# Patient Record
Sex: FEMALE | Race: ASIAN | NOT HISPANIC OR LATINO | Employment: FULL TIME | URBAN - METROPOLITAN AREA
[De-identification: names, ages, dates, MRNs, and addresses within clinical notes are randomized per-mention and may not be internally consistent; named-entity substitution may affect disease eponyms.]

---

## 2019-04-04 ENCOUNTER — TELEPHONE (OUTPATIENT)
Dept: FAMILY MEDICINE | Facility: CLINIC | Age: 29
End: 2019-04-04

## 2019-04-04 ENCOUNTER — OFFICE VISIT (OUTPATIENT)
Dept: FAMILY MEDICINE | Facility: CLINIC | Age: 29
End: 2019-04-04
Payer: COMMERCIAL

## 2019-04-04 VITALS
BODY MASS INDEX: 22.82 KG/M2 | WEIGHT: 137 LBS | HEIGHT: 65 IN | HEART RATE: 68 BPM | SYSTOLIC BLOOD PRESSURE: 112 MMHG | TEMPERATURE: 98.8 F | DIASTOLIC BLOOD PRESSURE: 64 MMHG

## 2019-04-04 DIAGNOSIS — Z11.4 SCREENING FOR HIV (HUMAN IMMUNODEFICIENCY VIRUS): ICD-10-CM

## 2019-04-04 DIAGNOSIS — Z30.40 ENCOUNTER FOR SURVEILLANCE OF CONTRACEPTIVES, UNSPECIFIED CONTRACEPTIVE: ICD-10-CM

## 2019-04-04 DIAGNOSIS — Z12.4 SCREENING FOR MALIGNANT NEOPLASM OF CERVIX: ICD-10-CM

## 2019-04-04 DIAGNOSIS — Z00.00 ROUTINE HISTORY AND PHYSICAL EXAMINATION OF ADULT: Primary | ICD-10-CM

## 2019-04-04 LAB
CHOLEST SERPL-MCNC: 191 MG/DL
GLUCOSE SERPL-MCNC: 84 MG/DL (ref 70–99)
HDLC SERPL-MCNC: 67 MG/DL
HIV 1+2 AB+HIV1 P24 AG SERPL QL IA: NONREACTIVE
LDLC SERPL CALC-MCNC: 100 MG/DL
NONHDLC SERPL-MCNC: 124 MG/DL
TRIGL SERPL-MCNC: 121 MG/DL

## 2019-04-04 PROCEDURE — 80061 LIPID PANEL: CPT | Performed by: FAMILY MEDICINE

## 2019-04-04 PROCEDURE — 82947 ASSAY GLUCOSE BLOOD QUANT: CPT | Performed by: FAMILY MEDICINE

## 2019-04-04 PROCEDURE — 87389 HIV-1 AG W/HIV-1&-2 AB AG IA: CPT | Performed by: FAMILY MEDICINE

## 2019-04-04 PROCEDURE — 99385 PREV VISIT NEW AGE 18-39: CPT | Performed by: FAMILY MEDICINE

## 2019-04-04 PROCEDURE — 36415 COLL VENOUS BLD VENIPUNCTURE: CPT | Performed by: FAMILY MEDICINE

## 2019-04-04 PROCEDURE — G0145 SCR C/V CYTO,THINLAYER,RESCR: HCPCS | Performed by: FAMILY MEDICINE

## 2019-04-04 RX ORDER — NORETHINDRONE ACETATE AND ETHINYL ESTRADIOL 1MG-20(21)
1 KIT ORAL DAILY
Refills: 14 | COMMUNITY
Start: 2019-03-12 | End: 2019-04-04

## 2019-04-04 RX ORDER — NORETHINDRONE ACETATE AND ETHINYL ESTRADIOL 1MG-20(21)
1 KIT ORAL DAILY
Qty: 84 TABLET | Refills: 3 | Status: SHIPPED | OUTPATIENT
Start: 2019-04-04 | End: 2020-12-01

## 2019-04-04 SDOH — HEALTH STABILITY: MENTAL HEALTH: HOW MANY STANDARD DRINKS CONTAINING ALCOHOL DO YOU HAVE ON A TYPICAL DAY?: 1 OR 2

## 2019-04-04 SDOH — HEALTH STABILITY: MENTAL HEALTH: HOW OFTEN DO YOU HAVE A DRINK CONTAINING ALCOHOL?: 2-3 TIMES A WEEK

## 2019-04-04 ASSESSMENT — MIFFLIN-ST. JEOR: SCORE: 1358.56

## 2019-04-04 NOTE — Clinical Note
Pap smear done on this date: 2016 (approximately), by this group: [facility], results were Normal.

## 2019-04-04 NOTE — PROGRESS NOTES
SUBJECTIVE:   CC: Kimmy Behtea is an 28 year old woman who presents for preventive health visit.     Healthy Habits:    Do you get at least three servings of calcium containing foods daily (dairy, green leafy vegetables, etc.)? yes    Amount of exercise or daily activities, outside of work: 2 day(s) per week    Problems taking medications regularly No    Medication side effects: No    Have you had an eye exam in the past two years? yes    Do you see a dentist twice per year? yes    Do you have sleep apnea, excessive snoring or daytime drowsiness?no  Pap smear done on this date: 2016 (approximately), by this group: [facility], results were Normal.         PROBLEMS TO ADD ON...    Today's PHQ-2 Score:   PHQ-2 ( 1999 [employer]) 4/4/2019   Q1: Little interest or pleasure in doing things 0   Q2: Feeling down, depressed or hopeless 0   PHQ-2 Score 0       Abuse: Current or Past(Physical, Sexual or Emotional)- NO  Do you feel safe in your environment? Yes    Social History     Tobacco Use     Smoking status: Never Smoker     Smokeless tobacco: Never Used   Substance Use Topics     Alcohol use: Yes     Frequency: 2-3 times a week     Drinks per session: 1 or 2     If you drink alcohol do you typically have >3 drinks per day or >7 drinks per week? No                     Reviewed orders with patient.  Reviewed health maintenance and updated orders accordingly - Yes  Patient Active Problem List   Diagnosis     Dermatitis     No past surgical history on file.    Social History     Tobacco Use     Smoking status: Never Smoker     Smokeless tobacco: Never Used   Substance Use Topics     Alcohol use: Yes     Frequency: 2-3 times a week     Drinks per session: 1 or 2     Family History   Problem Relation Age of Onset     Hypertension Father      Melanoma No family hx of      Skin Cancer No family hx of            Mammogram not appropriate for this patient based on age.    Pertinent mammograms are reviewed under the imaging  "tab.  History of abnormal Pap smear: NO - age 21-29 PAP every 3 years recommended     Reviewed and updated as needed this visit by clinical staff  Tobacco  Allergies  Meds  Fam Hx  Soc Hx        Reviewed and updated as needed this visit by Provider        Past Medical History:   Diagnosis Date     Dermatitis 9/23/2016        ROS:  CONSTITUTIONAL: NEGATIVE for fever, chills, change in weight  INTEGUMENTARU/SKIN: NEGATIVE for worrisome rashes, moles or lesions  EYES: NEGATIVE for vision changes or irritation  ENT: NEGATIVE for ear, mouth and throat problems  RESP: NEGATIVE for significant cough or SOB  BREAST: NEGATIVE for masses, tenderness or discharge  CV: NEGATIVE for chest pain, palpitations or peripheral edema  GI: NEGATIVE for nausea, abdominal pain, heartburn, or change in bowel habits  : NEGATIVE for unusual urinary or vaginal symptoms. Periods are regular.  MUSCULOSKELETAL: NEGATIVE for significant arthralgias or myalgia  NEURO: NEGATIVE for weakness, dizziness or paresthesias  ENDOCRINE: NEGATIVE for temperature intolerance, skin/hair changes  HEME/ALLERGY/IMMUNE: NEGATIVE for bleeding problems  PSYCHIATRIC: NEGATIVE for changes in mood or affect    OBJECTIVE:   /64   Pulse 68   Temp 98.8  F (37.1  C) (Tympanic)   Ht 1.645 m (5' 4.76\")   Wt 62.1 kg (137 lb)   LMP 04/01/2019   BMI 22.96 kg/m    EXAM:  GENERAL: healthy, alert and no distress  EYES: Eyes grossly normal to inspection, PERRL and conjunctivae and sclerae normal  HENT: ear canals and TM's normal, nose and mouth without ulcers or lesions  NECK: no adenopathy, no asymmetry, masses, or scars and thyroid normal to palpation  RESP: lungs clear to auscultation - no rales, rhonchi or wheezes  BREAST: normal without masses, tenderness or nipple discharge and no palpable axillary masses or adenopathy  CV: regular rate and rhythm, normal S1 S2, no S3 or S4, no murmur, click or rub, no peripheral edema and peripheral pulses strong  ABDOMEN: " soft, nontender, no hepatosplenomegaly, no masses and bowel sounds normal   (female): normal female external genitalia, normal urethral meatus, vaginal mucosa pink, moist, well rugated, and normal cervix/adnexa/uterus without masses or discharge  RECTAL: deferred  MS: no gross musculoskeletal defects noted, no edema  SKIN: no suspicious lesions or rashes  NEURO: Normal strength and tone, mentation intact and speech normal  PSYCH: mentation appears normal, affect normal/bright        ASSESSMENT/PLAN:   (Z00.00) Routine history and physical examination of adult  (primary encounter diagnosis)  Comment:   Plan: HIV Screening, Pap imaged thin layer screen         reflex to HPV if ASCUS - recommend age 25 - 29,        Lipid panel reflex to direct LDL Fasting,         Glucose            (Z12.4) Screening for malignant neoplasm of cervix  Comment:   Plan: Pap imaged thin layer screen reflex to HPV if         ASCUS - recommend age 25 - 29            (Z11.4) Screening for HIV (human immunodeficiency virus)  Comment:   Plan: HIV Screening            (Z30.40) Encounter for surveillance of contraceptives, unspecified contraceptive  Comment:   Plan: norethindrone-ethinyl estradiol (JUNEL FE 1/20)        1-20 MG-MCG tablet            Check labs. refill sent.Cares and  treatment discussed follow. up if problem   Patient expressed understanding and agreement with treatment plan. All patient's questions were answered, will let me know if has more later.  Medications: Rx's: Reviewed the potential side effects/complications of medications prescribed.     COUNSELING:   Reviewed preventive health counseling, as reflected in patient instructions       Regular exercise       Healthy diet/nutrition       Vision screening       Hearing screening       Immunizations    Previously Vaccinated for: TDAP  , she will bring records            Contraception       Osteoporosis Prevention/Bone Health       HIV screeninx in teen years, 1x in  "adult years, and at intervals if high risk    BP Readings from Last 1 Encounters:   04/04/19 112/64     Estimated body mass index is 22.96 kg/m  as calculated from the following:    Height as of this encounter: 1.645 m (5' 4.76\").    Weight as of this encounter: 62.1 kg (137 lb).           reports that  has never smoked. she has never used smokeless tobacco.      Counseling Resources:  ATP IV Guidelines  Pooled Cohorts Equation Calculator  Breast Cancer Risk Calculator  FRAX Risk Assessment  ICSI Preventive Guidelines  Dietary Guidelines for Americans, 2010  USDA's MyPlate  ASA Prophylaxis  Lung CA Screening    Jeniffer Small MD  [facility] [place]  "

## 2019-04-04 NOTE — TELEPHONE ENCOUNTER
Pt in for physical today, awaiting lab results to complete biometric form which was given to Tasha GOODWIN CMA

## 2019-04-05 NOTE — TELEPHONE ENCOUNTER
Forms have been filled out and placed in Dr. Small's in basket for completion.      .Tasha MENDIOLA    [facility] [place]

## 2019-04-08 LAB
COPATH REPORT: NORMAL
PAP: NORMAL

## 2019-04-09 NOTE — TELEPHONE ENCOUNTER
Completed, copy faxed to [facility] customer service and copy faxed to medical records.      .Tasha MENDIOLA    [facility] [place]

## 2019-08-20 ENCOUNTER — OFFICE VISIT (OUTPATIENT)
Dept: FAMILY MEDICINE | Facility: CLINIC | Age: 29
End: 2019-08-20
Payer: COMMERCIAL

## 2019-08-20 VITALS
WEIGHT: 135 LBS | SYSTOLIC BLOOD PRESSURE: 110 MMHG | HEART RATE: 75 BPM | HEIGHT: 65 IN | OXYGEN SATURATION: 99 % | DIASTOLIC BLOOD PRESSURE: 78 MMHG | TEMPERATURE: 99.1 F | BODY MASS INDEX: 22.49 KG/M2

## 2019-08-20 DIAGNOSIS — M77.8 BILATERAL ELBOW TENDONITIS: ICD-10-CM

## 2019-08-20 DIAGNOSIS — M77.8 WRIST TENDONITIS: Primary | ICD-10-CM

## 2019-08-20 PROCEDURE — 99214 OFFICE O/P EST MOD 30 MIN: CPT | Performed by: FAMILY MEDICINE

## 2019-08-20 RX ORDER — NAPROXEN 500 MG/1
500 TABLET ORAL 2 TIMES DAILY WITH MEALS
Qty: 30 TABLET | Refills: 0 | Status: SHIPPED | OUTPATIENT
Start: 2019-08-20 | End: 2019-10-17

## 2019-08-20 ASSESSMENT — MIFFLIN-ST. JEOR: SCORE: 1349.43

## 2019-08-20 NOTE — PATIENT INSTRUCTIONS
Patient Education     Tendonitis  A tendon is the thick fibrous cord that joins muscle to bone and allows joints to move. When a tendon becomes inflamed, it is called tendonitis. This can occur from overuse, injury, or infection. This usually involves the shoulders, forearm, wrist, hands and feet. Symptoms include pain, swelling and tenderness to the touch. Moving the joint increases the pain.  It takes 4 to 6 weeks or more for tendonitis to heal. It is treated by preventing motion of the tendon, occasionally with a splint or brace, and the use of anti-inflammatory medicine.  Home care    Some people find relief with ice packs. These can be crushed or cubed ice in a plastic bag or a bag of frozen vegetables wrapped in a thin towel. Other people get better relief with heat. This can include a hot shower, hot bath, or a moist towel warmed in a microwave. Try each and use the method that feels best, for 15 to 20 minutes several times a day.    Rest the inflamed joint and protect it from movement.    You may use over-the-counter ibuprofen or naproxen to treat pain and inflammation, unless another medicine was prescribed. If you can't take these medicines, acetaminophen may help with the pain, but does not treat inflammation. If you have chronic liver or kidney disease or ever had a stomach ulcer or gastrointestinal bleeding, talk with your doctor before using these medicines.    As your symptoms improve, begin gradual motion at the involved joint.  Follow-up care  Follow up with your healthcare provider if you are not improving after 5 to 7 days of treatment.  When to seek medical advice  Call your healthcare provider right away if any of these occur:    Redness over the painful area    Increasing pain or swelling at the joint    Fever lasting 24 to 48 hours or chills, or as advised by your healthcare provider  Date Last Reviewed: 5/1/2018 2000-2018 [employer]. [address], [place]  [address]. All rights reserved. This information is not intended as a substitute for professional medical care. Always follow your healthcare professional's instructions.

## 2019-08-20 NOTE — PROGRESS NOTES
Subjective     Kimmy Bethea is a 28 year old female who presents to clinic today for the following health issues:    HPI   Musculoskeletal problem/pain      Duration: ongoing since July     Description  Location: both arms -  starts at wrist and goes up the arm wrist / elbow area , on and off and becoming more frequent and constant now .     Intensity:  Severe, sharp pain     Accompanying signs and symptoms: radiation of pain to arm, no numbness, no swelling .      History  Previous similar problem: no   Previous evaluation:  none    Precipitating or alleviating factors:  Trauma or overuse: no recall of any injury but  she was canoeing back in July and she thinks it may have started few days after    also she does work on computer / [employer] etc. .   Aggravating factors include: feels worse when she is doing stuff in kitchen , cutting etc , doing computer is not too bad     Therapies tried and outcome: nothing      Patient Active Problem List   Diagnosis     Dermatitis     Past Surgical History:   Procedure Laterality Date     NO HISTORY OF SURGERY         Social History     Tobacco Use     Smoking status: Never Smoker     Smokeless tobacco: Never Used   Substance Use Topics     Alcohol use: Yes     Frequency: 2-3 times a week     Drinks per session: 1 or 2     Family History   Problem Relation Age of Onset     Hypertension Father      Coronary Artery Disease Other         maternal uncle at age 50+     Hyperlipidemia Mother      Melanoma No family hx of      Skin Cancer No family hx of      Diabetes No family hx of      Cerebrovascular Disease No family hx of      Breast Cancer No family hx of      Colon Cancer No family hx of            Reviewed and updated as needed this visit by Provider         Review of Systems   ROS COMP: Constitutional, HEENT, cardiovascular, pulmonary, GI, , musculoskeletal, neuro, skin, endocrine and psych systems are negative, except as otherwise noted.      Objective    There were no vitals  taken for this visit.  There is no height or weight on file to calculate BMI.  Physical Exam   GENERAL: healthy, alert and no distress  RESP: lungs clear to auscultation - no rales, rhonchi or wheezes  CV: regular rate and rhythm, normal S1 S2,   MS: bth wrist/ arms/ elbows with  no obvious swelling or erythema , has good  range of motion, of wrist, elbow and fingers,   and tenderness to palpation mostly on ventral aspect of both elbow/ forearm muscles and medial epicondyle of elbows. Elbow and wrist motion against resistance aggravates discomfort   SKIN: no suspicious lesions or rashes  NEURO: Normal strength and tone            Assessment & Plan     (M77.8) Wrist tendonitis  (primary encounter diagnosis)  Comment: bilateral, forearm/ wrist pain   Plan: naproxen (NAPROSYN) 500 MG tablet            (M77.8) Bilateral elbow tendonitis  Comment: bilateral,medial epicondylitis   Plan: naproxen (NAPROSYN) 500 MG tablet          Strain/ tendonitis likely related to canoeing      discussed  cares and symptomatic treatment including  adequate pain control, ice ,  stretches etc. Script faxed    she will do follow up if no improvement or problem. Consider further evaluation and  physical therapy if needed.     Patient expressed understanding and agreement with treatment plan. All patient's questions were answered, will let me know if has more later.  Medications: Rx's: Reviewed the potential side effects/complications of medications prescribed.       Return in about 2 weeks (around 9/3/2019), or sooner if problem .    Jeniffer Small MD  [facility] [place]

## 2019-10-17 ENCOUNTER — OFFICE VISIT (OUTPATIENT)
Dept: FAMILY MEDICINE | Facility: CLINIC | Age: 29
End: 2019-10-17
Payer: COMMERCIAL

## 2019-10-17 VITALS
TEMPERATURE: 98.6 F | SYSTOLIC BLOOD PRESSURE: 108 MMHG | HEART RATE: 74 BPM | OXYGEN SATURATION: 98 % | HEIGHT: 65 IN | BODY MASS INDEX: 21.99 KG/M2 | DIASTOLIC BLOOD PRESSURE: 62 MMHG | WEIGHT: 132 LBS

## 2019-10-17 DIAGNOSIS — H60.392 INFECTIVE OTITIS EXTERNA, LEFT: ICD-10-CM

## 2019-10-17 DIAGNOSIS — Z30.40 ENCOUNTER FOR SURVEILLANCE OF CONTRACEPTIVES, UNSPECIFIED CONTRACEPTIVE: ICD-10-CM

## 2019-10-17 DIAGNOSIS — H93.12 RINGING IN EAR, LEFT: ICD-10-CM

## 2019-10-17 DIAGNOSIS — H66.90 EAR INFECTION: Primary | ICD-10-CM

## 2019-10-17 PROCEDURE — 99214 OFFICE O/P EST MOD 30 MIN: CPT | Performed by: FAMILY MEDICINE

## 2019-10-17 RX ORDER — AZITHROMYCIN 250 MG/1
TABLET, FILM COATED ORAL
Qty: 6 TABLET | Refills: 0 | Status: SHIPPED | OUTPATIENT
Start: 2019-10-17 | End: 2019-10-22

## 2019-10-17 RX ORDER — OFLOXACIN 3 MG/ML
10 SOLUTION AURICULAR (OTIC) DAILY
Qty: 5 ML | Refills: 0 | Status: SHIPPED | OUTPATIENT
Start: 2019-10-17 | End: 2019-10-27

## 2019-10-17 ASSESSMENT — MIFFLIN-ST. JEOR: SCORE: 1330.66

## 2019-10-17 NOTE — PROGRESS NOTES
Subjective     Kimmy Bethea is a 29 year old female who presents to clinic today for the following health issues:    HPI     Ear problem x 1 week - ringing in left ear since going to a concert last week,   although she can still hear ok- some discomfort and ache in both ears but left is more. Trying to use Q tip to help but not helping.   No nasal congestion - back of throat is also irritated. No fever or chills. No allergies . no swimming  lately . Sx come  and goes but concerned with ongoing sx . No dizziness etc      PROBLEMS TO ADD ON...  Medication Follow up of Birth control pill    Taking Medication as prescribed: yes    Side Effects:  Periods are irregular , since she has changed new pills , but mostly she has very light period or no period  some months. But this last time  she got period although her bleeding  is not more then her usual  but lasted for 5-6 days, so was concerned No bleeding in between cycle. Taking BCP regularly     Medication Helping Symptoms:  Yes - but wondering if she needs  to change dose due to possible side effects           Patient Active Problem List   Diagnosis     Dermatitis     Wrist tendonitis     Bilateral elbow tendonitis     Past Surgical History:   Procedure Laterality Date     NO HISTORY OF SURGERY         Social History     Tobacco Use     Smoking status: Never Smoker     Smokeless tobacco: Never Used   Substance Use Topics     Alcohol use: Yes     Frequency: 2-3 times a week     Drinks per session: 1 or 2     Family History   Problem Relation Age of Onset     Hypertension Father      Coronary Artery Disease Other         maternal uncle at age 50+     Hyperlipidemia Mother      Melanoma No family hx of      Skin Cancer No family hx of      Diabetes No family hx of      Cerebrovascular Disease No family hx of      Breast Cancer No family hx of      Colon Cancer No family hx of            Reviewed and updated as needed this visit by Provider         Review of Systems   ROS COMP:  Constitutional, HEENT, cardiovascular, pulmonary, GI, , musculoskeletal, neuro, skin, endocrine and psych systems are negative, except as otherwise noted.      Objective    There were no vitals taken for this visit.  There is no height or weight on file to calculate BMI.  Physical Exam   GENERAL: healthy, alert and no distress  EYES: Eyes grossly normal to inspection,  conjunctivae and sclerae normal  HENT:  right ear canal and TM : normal  LEFT ear , has wax, so as I tried to clean with curet I was able to visualize part of ear canal it looked inflamed and she was quiet sensitive, and TM  Also only partially visualized and slightly erythematous.  nasal mucosa slightly edematous , oropharynx clear and oral mucous membranes moist, no sinus tenderness  NECK: no adenopathy  RESP: lungs clear to auscultation - no rales, rhonchi or wheezes  CV: regular rate and rhythm, normal S1 S2, no S3 or S4,   NEURO: Normal strength and tone,             Assessment & Plan     (H66.90) Ear infection  (primary encounter diagnosis)  Comment: left OM   Plan: azithromycin (ZITHROMAX) 250 MG tablet            (H60.392) Infective otitis externa, left  Comment: also still has some wax, but ear canal was very sore so did not do ear lavage   Plan: ofloxacin (FLOXIN) 0.3 % otic solution            (H93.12) Ringing in ear, left  Comment:   Plan: discussed acres and concerns and treatment. She will do follow up if no improvement or ongoing problem     (Z30.40) Encounter for surveillance of contraceptives, unspecified contraceptive  Comment: no breakthrough bleeding   Plan: reassurance and observation. Her cycle is still regular so she is comfortable continuing and f/u if any problem        Return in about 2 weeks (around 10/31/2019). if no improvement or problem     Jeniffer Small MD  [facility] [place]

## 2019-10-18 ENCOUNTER — TELEPHONE (OUTPATIENT)
Dept: FAMILY MEDICINE | Facility: CLINIC | Age: 29
End: 2019-10-18

## 2019-10-18 NOTE — TELEPHONE ENCOUNTER
I agree with this. I would add a decongestant (Sudafed) and an antihistamine (Zyrtec or allegra) to help facilitate resolution of any effusion behind the ear drum if that is a contributor.    Without looking in her ear, it is impossible to tell if wax is causing muffled hearing or if it is something else. Go to  if this worsens over the weekend. Otherwise, see Dr. Small Monday.     Thanks.

## 2019-10-18 NOTE — TELEPHONE ENCOUNTER
LOV yesterday with Dr. Small. New ear pain in the left ear since starting ear drops yesterday (olofloxacin) for infective otitis externa. Patient was also started on a Z annie.    Patient calling today not due to the new pain, but due to clogged feeling and decreased hearing in that ear. Patient is concerned that it may not be a temporary loss of hearing.     States that Dr. Small did say that there was a lot of wax in her left ear. Did not want to remove it due to infection.    Advised the patient to take warm shower, apply warm compresses to her left ear 15-20 minutes 4 times a day and to take tylenol for pain. Advised that the clogged feeling is probably due to ear wax.    Advised UC for new or worsening symptoms. Schedule with Dr. Small on Monday if symptoms don't improve.    Anything else please advise.   Patient requests call

## 2019-10-18 NOTE — TELEPHONE ENCOUNTER
Reason for call:  Patient reporting a symptom    Symptom or request: Ears clogged      Duration (how long have symptoms been present): 1 day     Have you been treated for this before? Yes    Additional comments: Patient came in for ringing in ears and was prescribed ear drops. She now says that she feels like her ear are clogged and its getting worse. Would like to speak to someone about what do.     Phone Number patient can be reached at:  Home number on file [phone] (home)    Best Time:  Anytime     Can we leave a detailed message on this number:  YES    Call taken on 10/18/2019 at 2:01 PM by Radha Melendez

## 2019-10-21 ENCOUNTER — OFFICE VISIT (OUTPATIENT)
Dept: FAMILY MEDICINE | Facility: CLINIC | Age: 29
End: 2019-10-21
Payer: COMMERCIAL

## 2019-10-21 VITALS
HEART RATE: 71 BPM | TEMPERATURE: 98.8 F | WEIGHT: 132 LBS | BODY MASS INDEX: 21.99 KG/M2 | DIASTOLIC BLOOD PRESSURE: 70 MMHG | HEIGHT: 65 IN | SYSTOLIC BLOOD PRESSURE: 112 MMHG | OXYGEN SATURATION: 99 %

## 2019-10-21 DIAGNOSIS — R09.81 NASAL CONGESTION: ICD-10-CM

## 2019-10-21 DIAGNOSIS — H60.392 INFECTIVE OTITIS EXTERNA, LEFT: ICD-10-CM

## 2019-10-21 DIAGNOSIS — H66.90 EAR INFECTION: ICD-10-CM

## 2019-10-21 DIAGNOSIS — H61.20 WAX IN EAR: Primary | ICD-10-CM

## 2019-10-21 PROCEDURE — 99213 OFFICE O/P EST LOW 20 MIN: CPT | Performed by: FAMILY MEDICINE

## 2019-10-21 RX ORDER — FLUTICASONE PROPIONATE 50 MCG
1-2 SPRAY, SUSPENSION (ML) NASAL DAILY
Qty: 16 G | Status: SHIPPED | OUTPATIENT
Start: 2019-10-21 | End: 2019-11-05

## 2019-10-21 ASSESSMENT — MIFFLIN-ST. JEOR: SCORE: 1330.66

## 2019-10-21 NOTE — PROGRESS NOTES
Subjective     Kimmy Bethea is a 29 year old female who presents to clinic today for the following health issues:    HPI   Acute Illness   Acute illness concerns: EAR, was treated for ear infection last week and she is better pain wise but ear is   still plugged so affecting hearing an has ringing   No previous hearing problem or ringing etc   Onset: x one week    Fever: no    Chills/Sweats: no    Headache (location?): no    Sinus Pressure:no    Conjunctivitis:  no    Ear Pain: YES: left, also had wax but not able to get rid off    Rhinorrhea: Has some sniffles and congestion typically can fall, so she thinks it could be allergies.  Has been on the Flonase before using it currently willing to try that again.     Congestion: no    Sore Throat: no     Cough: no    Wheeze: no    Decreased Appetite: no    Nausea: no    Vomiting: no    Diarrhea:  no    Dysuria/Freq.: no    Fatigue/Achiness: no    Sick/Strep Exposure: no     Therapies Tried and outcome:         Patient Active Problem List   Diagnosis     Dermatitis     Wrist tendonitis     Bilateral elbow tendonitis     Past Surgical History:   Procedure Laterality Date     NO HISTORY OF SURGERY         Social History     Tobacco Use     Smoking status: Never Smoker     Smokeless tobacco: Never Used   Substance Use Topics     Alcohol use: Yes     Frequency: 2-3 times a week     Drinks per session: 1 or 2     Family History   Problem Relation Age of Onset     Hypertension Father      Coronary Artery Disease Other         maternal uncle at age 50+     Hyperlipidemia Mother      Melanoma No family hx of      Skin Cancer No family hx of      Diabetes No family hx of      Cerebrovascular Disease No family hx of      Breast Cancer No family hx of      Colon Cancer No family hx of              Reviewed and updated as needed this visit by Provider         Review of Systems   ROS COMP: Constitutional, HEENT, cardiovascular, pulmonary, GI, , musculoskeletal, neuro, skin, endocrine  and psych systems are negative, except as otherwise noted.      Objective    There were no vitals taken for this visit.  There is no height or weight on file to calculate BMI.  Physical Exam   GENERAL: healthy, alert and no distress  EYES: Eyes grossly normal to inspection, PERRL and conjunctivae and sclerae normal  NECK: no adenopathy  .HEETN-throat without any pharyngeal erythema or any lesions , nasal mucosa slightly edematous with clear drainage, right TM and ear canal is normal.  Left and ear canal-still has some wax , looks soft so I attempted to clean with  lighted ear curette, and had good results.  Quite a bit of wax was removed, she still had small deep wax but eardrum was also partially visualized and looks normal.  At that point patient also reported improvement and she could hear the ringing was gone so we decided to quit not DIG  it further to remove the rest of the wax.   RESP: lungs clear to auscultation - no rales, rhonchi or wheezes  CV: regular rate and rhythm, normal S1 S2, no S3 or S4, no murmur, click or rub, no peripheral edema and peripheral pulses strong            Assessment & Plan     (H61.20) Wax in ear  (primary encounter diagnosis)  Comment:   Plan: Cerumenosis is noted.  Wax is removed by  manual debridement, with good results.  Patient reported improvement and she could hear well and told me that the ringing has resolved. home care to prevent wax buildup       (H66.90) Ear infection  Comment: left , improved  Plan: Improved    (H60.392) Infective otitis externa, left  Comment: She will finish her eardrops.   Plan: Improved    (R09.81) Nasal congestion  Comment: seasonal , fall mostly  Plan: fluticasone (FLONASE) 50 MCG/ACT nasal spray        She has history of some nasal congestion during for.  Has been on Flonase previously.  Willing to use that season as needed . refill was given        Patient expressed understanding and agreement with treatment plan. All patient's questions were  answered, will let me know if has more later.  Medications: Rx's: Reviewed the potential side effects/complications of medications prescribed.       Return in about 4 weeks (around 11/18/2019). id sx persist or ongoing problem     Jeniffer Small MD  [facility] [place]

## 2019-11-04 DIAGNOSIS — R09.81 NASAL CONGESTION: ICD-10-CM

## 2019-11-05 RX ORDER — FLUTICASONE PROPIONATE 50 MCG
1-2 SPRAY, SUSPENSION (ML) NASAL DAILY
Qty: 16 G | Refills: 11 | Status: SHIPPED | OUTPATIENT
Start: 2019-11-05 | End: 2021-09-16

## 2019-11-05 NOTE — TELEPHONE ENCOUNTER
"Requested Prescriptions   Pending Prescriptions Disp Refills     fluticasone (FLONASE) 50 MCG/ACT nasal spray 16 g prn     Sig: Spray 1-2 sprays into both nostrils daily  Last Written Prescription Date:  10/21/19  Last Fill Quantity: 16g,  # refills: prn   Last office visit: 10/21/2019 with prescribing provider:  Geovanny   Future Office Visit:           Inhaled Steroids Protocol Passed - 11/4/2019  4:59 PM        Passed - Patient is age 12 or older        Passed - Recent (12 mo) or future (30 days) visit within the authorizing provider's specialty     Patient has had an office visit with the authorizing provider or a provider within the authorizing providers department within the previous 12 mos or has a future within next 30 days. See \"Patient Info\" tab in inbasket, or \"Choose Columns\" in Meds & Orders section of the refill encounter.              Passed - Medication is active on med list          "

## 2020-03-07 ENCOUNTER — OFFICE VISIT (OUTPATIENT)
Dept: URGENT CARE | Facility: URGENT CARE | Age: 30
End: 2020-03-07
Payer: COMMERCIAL

## 2020-03-07 VITALS
WEIGHT: 133 LBS | SYSTOLIC BLOOD PRESSURE: 108 MMHG | TEMPERATURE: 99.8 F | OXYGEN SATURATION: 97 % | HEART RATE: 68 BPM | DIASTOLIC BLOOD PRESSURE: 74 MMHG | BODY MASS INDEX: 22.3 KG/M2

## 2020-03-07 DIAGNOSIS — H92.02 OTALGIA, LEFT: Primary | ICD-10-CM

## 2020-03-07 PROCEDURE — 99213 OFFICE O/P EST LOW 20 MIN: CPT | Performed by: FAMILY MEDICINE

## 2020-03-07 RX ORDER — CIPROFLOXACIN AND DEXAMETHASONE 3; 1 MG/ML; MG/ML
4 SUSPENSION/ DROPS AURICULAR (OTIC) 2 TIMES DAILY
Qty: 7.5 ML | Refills: 0 | Status: SHIPPED | OUTPATIENT
Start: 2020-03-07 | End: 2021-09-16

## 2020-03-07 NOTE — PROGRESS NOTES
Subjective     Kimmy Bethea is a 29 year old female who presents to clinic today for the following health issues:    HPI   Chief Complaint   Patient presents with     Urgent Care     Ear Problem     left side of face,ear hurts.       Duration: 1 week     Description (location/character/radiation): left ear pain, headache and facial pain    Intensity:  moderate    Accompanying signs and symptoms: no fever, chills, sore throat    History (similar episodes/previous evaluation): None    Precipitating or alleviating factors: None    Therapies tried and outcome: debrox         Patient Active Problem List   Diagnosis     Dermatitis     Wrist tendonitis     Bilateral elbow tendonitis     Past Surgical History:   Procedure Laterality Date     NO HISTORY OF SURGERY         Social History     Tobacco Use     Smoking status: Never Smoker     Smokeless tobacco: Never Used   Substance Use Topics     Alcohol use: Yes     Frequency: 2-3 times a week     Drinks per session: 1 or 2     Family History   Problem Relation Age of Onset     Hypertension Father      Coronary Artery Disease Other         maternal uncle at age 50+     Hyperlipidemia Mother      Melanoma No family hx of      Skin Cancer No family hx of      Diabetes No family hx of      Cerebrovascular Disease No family hx of      Breast Cancer No family hx of      Colon Cancer No family hx of          Current Outpatient Medications   Medication Sig Dispense Refill     norethindrone-ethinyl estradiol (JUNEL FE 1/20) 1-20 MG-MCG tablet Take 1 tablet by mouth daily 84 tablet 3     fluticasone (FLONASE) 50 MCG/ACT nasal spray Spray 1-2 sprays into both nostrils daily (Patient not taking: Reported on 3/7/2020) 16 g 11     hydrocortisone 2.5 % ointment To lips twice daily. (Patient not taking: Reported on 4/4/2019) 30 g 2     No Known Allergies  Recent Labs   Lab Test 04/04/19  0841   *   HDL 67   TRIG 121      BP Readings from Last 3 Encounters:   03/07/20 108/74   10/21/19  112/70   10/17/19 108/62    Wt Readings from Last 3 Encounters:   03/07/20 60.3 kg (133 lb)   10/21/19 59.9 kg (132 lb)   10/17/19 59.9 kg (132 lb)                    Reviewed and updated as needed this visit by Provider         Review of Systems   ROS COMP: Constitutional, HEENT, cardiovascular, pulmonary, gi and gu systems are negative, except as otherwise noted.      Objective    /74   Pulse 68   Temp 99.8  F (37.7  C) (Oral)   Wt 60.3 kg (133 lb)   SpO2 97%   BMI 22.30 kg/m    Body mass index is 22.3 kg/m .  Physical Exam   GENERAL: alert and no distress  EYES: Eyes grossly normal to inspection, PERRL and conjunctivae and sclerae normal  HENT: normal cephalic/atraumatic, right ear: normal: no effusions, no erythema, normal landmarks, left ears: slightly erythematous external canal with some tenderness, nose and mouth without ulcers or lesions, oropharynx clear and oral mucous membranes moist, no abnormal rashes noted  NECK: no adenopathy, no asymmetry, masses, or scars and thyroid normal to palpation  RESP: lungs clear to auscultation - no rales, rhonchi or wheezes  CV: regular rates and rhythm, normal S1 S2, no S3 or S4 and no murmur, click or rub  ABDOMEN: soft, nontender  MS: no gross musculoskeletal defects noted, no edema    Assessment & Plan     (H92.02) Otalgia, left  (primary encounter diagnosis)  Comment: Differentials discussed in detail including otitis externa.  Ciprodex prescribed, common side effects discussed.  Suggested avoid using Q-tips.  Follow-up with ENT if symptoms persist or worsen.  Patient understood and in agreement with above plan.  All question answered.  Plan: ciprofloxacin-dexamethasone (CIPRODEX) 0.3-0.1         % otic suspension, OTOLARYNGOLOGY REFERRAL       Urgent Care Provider  [facility]

## 2020-06-24 ENCOUNTER — OFFICE VISIT (OUTPATIENT)
Dept: FAMILY MEDICINE | Facility: CLINIC | Age: 30
End: 2020-06-24
Payer: COMMERCIAL

## 2020-06-24 VITALS
BODY MASS INDEX: 23.14 KG/M2 | TEMPERATURE: 99.3 F | HEART RATE: 88 BPM | OXYGEN SATURATION: 100 % | DIASTOLIC BLOOD PRESSURE: 70 MMHG | SYSTOLIC BLOOD PRESSURE: 96 MMHG | WEIGHT: 138 LBS

## 2020-06-24 DIAGNOSIS — R59.9 ENLARGED LYMPH NODE: ICD-10-CM
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
## 2020-06-24 PROCEDURE — 99213 OFFICE O/P EST LOW 20 MIN: CPT | Performed by: FAMILY MEDICINE

## 2020-06-24 NOTE — PROGRESS NOTES
Subjective     Kimmy Bethea is a 29 year old female who presents to clinic today for the following health issues:    HPI   Concern - Pt states she noticed a lump on the left side of her neck two days ago.   Denies pain or warmth or tenderness on the lump       She denies and  sore throat, cough, cold,  sax no fever or chills, night sweat and she is feeling well otherwise    lump is not painful , some fatigue but it could be different work routine .   On further questioning  during exam,  she admits to bug bite on back of  neck left side. She thinsk it was quiet swollen red and painful although swelling has gone down but still feels irritated . No fever or chills        Patient Active Problem List   Diagnosis     Dermatitis     Wrist tendonitis     Bilateral elbow tendonitis     Past Surgical History:   Procedure Laterality Date     NO HISTORY OF SURGERY         Social History     Tobacco Use     Smoking status: Never Smoker     Smokeless tobacco: Never Used   Substance Use Topics     Alcohol use: Yes     Frequency: 2-3 times a week     Drinks per session: 1 or 2     Family History   Problem Relation Age of Onset     Hypertension Father      Coronary Artery Disease Other         maternal uncle at age 50+     Hyperlipidemia Mother      Melanoma No family hx of      Skin Cancer No family hx of      Diabetes No family hx of      Cerebrovascular Disease No family hx of      Breast Cancer No family hx of      Colon Cancer No family hx of            PROBLEMS TO ADD ON...  Reviewed and updated as needed this visit by Provider         Review of Systems   Constitutional, HEENT, cardiovascular, pulmonary, GI, , musculoskeletal, neuro, skin, endocrine and psych systems are negative, except as otherwise noted.      Objective    There were no vitals taken for this visit.  There is no height or weight on file to calculate BMI.  Physical Exam   GENERAL: healthy, alert and no distress  EYES: Eyes grossly normal to inspection  and  conjunctivae and sclerae normal  HENT: ear canals and TM's normal,  and mouth without ulcers or lesions  NECK: no anterior neck adenopathy although she has one small pea size LN along  left posterior cervical chain , mobile , non tender, no other  masses,  and thyroid normal to palpation  SKIN: on the back of left side of neck,  has an inch size area of erythema with mild central induration , no drainage but it looks  blood tinged with mild tenderness slight warmth  RESP: lungs clear to auscultation - no rales, rhonchi or wheezes  CV: regular rate and rhythm, normal S1 S2, no S3 or S4, no murmur,                 Assessment & Plan       (R59.9) Enlarged lymph node  Comment: left posterior cervical , likely reactive adenitis   Plan: amoxicillin-clavulanate (AUGMENTIN) 875-125 MG         tablet          (W57.XXXA) Bug bite, initial encounter  (primary encounter diagnosis)  Comment: left side of back of neck -   Plan: amoxicillin-clavulanate (AUGMENTIN) 875-125 MG         tablet          Start treatment with Augmentin. Skin cares and  treatment discussed. follow up if problem   Patient expressed understanding and agreement with treatment plan. All patient's questions were answered, will let me know if has more later.  Medications: Rx's: Reviewed the potential side effects/complications of medications prescribed.       Jeniffer Small MD  [facility] [place]

## 2020-10-13 ENCOUNTER — OFFICE VISIT (OUTPATIENT)
Dept: FAMILY MEDICINE | Facility: CLINIC | Age: 30
End: 2020-10-13
Payer: COMMERCIAL

## 2020-10-13 VITALS
HEART RATE: 71 BPM | SYSTOLIC BLOOD PRESSURE: 102 MMHG | WEIGHT: 136 LBS | OXYGEN SATURATION: 98 % | BODY MASS INDEX: 22.81 KG/M2 | DIASTOLIC BLOOD PRESSURE: 60 MMHG | TEMPERATURE: 99.2 F | RESPIRATION RATE: 14 BRPM

## 2020-10-13 DIAGNOSIS — M77.8 BILATERAL ELBOW TENDONITIS: Primary | ICD-10-CM

## 2020-10-13 DIAGNOSIS — M77.8 WRIST TENDONITIS: ICD-10-CM

## 2020-10-13 DIAGNOSIS — Z23 NEED FOR PROPHYLACTIC VACCINATION AND INOCULATION AGAINST INFLUENZA: ICD-10-CM

## 2020-10-13 DIAGNOSIS — M77.8 BILATERAL ELBOW TENDONITIS: ICD-10-CM

## 2020-10-13 PROCEDURE — 90471 IMMUNIZATION ADMIN: CPT | Performed by: FAMILY MEDICINE

## 2020-10-13 PROCEDURE — 99213 OFFICE O/P EST LOW 20 MIN: CPT | Mod: 25 | Performed by: FAMILY MEDICINE

## 2020-10-13 PROCEDURE — 90686 IIV4 VACC NO PRSV 0.5 ML IM: CPT | Performed by: FAMILY MEDICINE

## 2020-10-13 RX ORDER — NAPROXEN 500 MG/1
500 TABLET ORAL 2 TIMES DAILY WITH MEALS
Qty: 30 TABLET | Refills: 0 | Status: SHIPPED | OUTPATIENT
Start: 2020-10-13 | End: 2021-09-16

## 2020-10-13 NOTE — PATIENT INSTRUCTIONS
Patient Education     Tendonitis  A tendon is the thick fibrous cord that joins muscle to bone and allows joints to move. When a tendon becomes inflamed, it is called tendonitis. This can occur from overuse, injury, or infection. This usually involves the shoulders, forearm, wrist, hands and feet. Symptoms include pain, swelling and tenderness to the touch. Moving the joint increases the pain.  It takes 4 to 6 weeks or more for tendonitis to heal. It is treated by preventing motion of the tendon, occasionally with a splint or brace, and the use of anti-inflammatory medicine.  Home care    Some people find relief with ice packs. These can be crushed or cubed ice in a plastic bag or a bag of frozen vegetables wrapped in a thin towel. Other people get better relief with heat. This can include a hot shower, hot bath, or a moist towel warmed in a microwave. Try each and use the method that feels best, for 15 to 20 minutes several times a day.    Rest the inflamed joint and protect it from movement.    You may use over-the-counter ibuprofen or naproxen to treat pain and inflammation, unless another medicine was prescribed. If you can't take these medicines, acetaminophen may help with the pain, but does not treat inflammation. If you have chronic liver or kidney disease or ever had a stomach ulcer or gastrointestinal bleeding, talk with your doctor before using these medicines.    As your symptoms improve, begin gradual motion at the involved joint.  Follow-up care  Follow up with your healthcare provider if you are not improving after 5 to 7 days of treatment.  When to seek medical advice  Call your healthcare provider right away if any of these occur:    Redness over the painful area    Increasing pain or swelling at the joint    Fever lasting 24 to 48 hours or chills, or as advised by your healthcare provider  Date Last Reviewed: 5/1/2018 2000-2019 [employer]. [address] [facility], [place]  [address]. All rights reserved. This information is not intended as a substitute for professional medical care. Always follow your healthcare professional's instructions.

## 2020-10-13 NOTE — PROGRESS NOTES
Subjective     Kimmy Bethea is a 30 year old female who presents to clinic today for the following health issues:    HPI         Musculoskeletal problem/pain  Onset/Duration: 2-3  weeks   Description  Location: elbow - bilateral  Joint Swelling: no  Redness: no  Pain: YES  Warmth: no  Intensity:  moderate  Progression of Symptoms:   improving , but still has ongoing issue   Accompanying signs and symptoms:   Fevers: no  Numbness/tingling/weakness: no  History  Trauma to the area: no recall of injury , but possible over use as she has been  playing video game more often since covid, cooking and lot of keyboarding etc.  she has backed off in playing video game so it has helped,  but just concerned bc of ongoing sx so wanted to get checked   Recent illness:  no  Previous similar problem: YES. She has mostly had issue with wrist previously, although she thinks elbow were also bothering a bit at that time. Wrist have improved although still may be bit sensitive but elbows are bothering more   Previous evaluation:  YES  Precipitating or alleviating factors:  Aggravating factors include: lifting and overuse  Therapies tried and outcome: rest/inactivity and support wrap, elbow and wrist brace she is using       Review of Systems   Constitutional, HEENT, cardiovascular, pulmonary, GI, , musculoskeletal, neuro, skin, endocrine and psych systems are negative, except as otherwise noted.      Objective    There were no vitals taken for this visit.  There is no height or weight on file to calculate BMI.  Physical Exam   GENERAL: healthy, alert and no distress  RESP: lungs clear to auscultation - no rales, rhonchi or wheezes  CV: regular rate and rhythm, normal S1 S2,   ABDOMEN: soft, nontender, no hepatosplenomegaly, no masses and bowel sounds normal  MS: both both  elbows with good ROM  but has  and tenderness to palpation along forearm muscles but  not as much tenderness along elbow joints at this time, wrist also with no acute  tenderness but wrist ROM aggravates some discomfort   NEURO: Normal strength and tone        Assessment & Plan        (M77.8) Bilateral elbow tendonitis  Comment:  Strain/ tendonitis recent aggravation  likely related to repetitive activities   Plan: naproxen (NAPROSYN) 500 MG tablet, MYRNA PT,         HAND, AND CHIROPRACTIC REFERRAL    (M77.8) Wrist tendonitis  Comment: bilateral forearm/ wrist pain,  Had problem previously with some improvement but elbows are bothering her more lately   Plan: naproxen (NAPROSYN) 500 MG tablet          discussed  cares and symptomatic treatment including  adequate pain control, heat,  stretches etc. Script faxed also gave referral to   physical therapy    she will do follow up if no improvement or problem. Consider further evaluation  if needed.       (Z23) Need for prophylactic vaccination and inoculation against influenza  Comment:   Plan: INFLUENZA VACCINE IM > 6 MONTHS VALENT IIV4         [81657]  Script  sent.Cares and  treatment discussed.  follow up if problem   Patient expressed understanding and agreement with treatment plan. All patient's questions were answered, will let me know if has more later.  Medications: Rx's: Reviewed the potential side effects/complications of medications prescribed.       Return in about 4 weeks (around 11/10/2020), or sooner if problem, for Physical Exam- any time .    Jeniffer Small MD  [facility]

## 2020-10-28 ENCOUNTER — THERAPY VISIT (OUTPATIENT)
Dept: OCCUPATIONAL THERAPY | Facility: CLINIC | Age: 30
End: 2020-10-28
Attending: FAMILY MEDICINE
Payer: COMMERCIAL

## 2020-10-28 DIAGNOSIS — M65.4 DE QUERVAIN'S DISEASE (TENOSYNOVITIS): ICD-10-CM

## 2020-10-28 DIAGNOSIS — M77.8 BILATERAL ELBOW TENDONITIS: ICD-10-CM

## 2020-10-28 DIAGNOSIS — M79.646 THUMB PAIN, UNSPECIFIED LATERALITY: ICD-10-CM

## 2020-10-28 PROCEDURE — 97110 THERAPEUTIC EXERCISES: CPT | Mod: GO | Performed by: OCCUPATIONAL THERAPIST

## 2020-10-28 PROCEDURE — 97165 OT EVAL LOW COMPLEX 30 MIN: CPT | Mod: GO | Performed by: OCCUPATIONAL THERAPIST

## 2020-10-28 PROCEDURE — 97760 ORTHOTIC MGMT&TRAING 1ST ENC: CPT | Mod: GO | Performed by: OCCUPATIONAL THERAPIST

## 2020-10-28 NOTE — PROGRESS NOTES
Hand Therapy Initial Evaluation    Current Date:  10/28/2020  Referring Physician: Jeniffer Small MD    Diagnosis: Bilateral elbow tendonitis (right greater than left)  DOI: 10/13/20 (Date of order)    Subjective:  Kimmy Bethea is a 30 year old right hand dominant female.    Patient reports symptoms of pain and weakness/loss of strength of the bilateral elbows which occurred due to overuse and repetitive motions. Since onset symptoms are Gradually getting better.  Special tests:  none.  Previous treatment: none.    General health as reported by patient is good.  Pertinent medical history includes:None  Medical allergies:none.  Surgical history: none.  Medication history: None.    Occupational Profile Information:  Current occupation is Digital   Currently working in normal job without restrictions  Job Tasks: Computer Work, Lifting, Carrying, Prolonged Sitting  Prior functional level:  no limitations  Barriers include:none  Mobility: No difficulty  Transportation: drives  Leisure activities/hobbies: floral arranging, video games  Upper Extremity Functional Index Score:  SCORE:   Column Totals: /80: 54   (A lower score indicates greater disability.)      Objective:  Pain Level (Scale 0-10):   10/28/2020   At Rest 3/10   With Use 8/10     Pain Description:  Date 10/28/2020   Location Medial and lateral elbow   Pain Quality Aching, Dull and Sharp   Frequency intermittent     Pain is worst  daytime   Exacerbated by  holding, gripping, elbows flexed, use of right thumb   Relieved by rest and wrist braces, extending elbows   Progression Gradually improving.     Posture  Rounded Forward Shoulders    Sensation  WNL throughout all nerve distributions; per patient report    ROM  Pain Report: - none  + mild    ++ moderate    +++ severe   Elbow 10/28/2020 10/28/2020   AROM (PROM) Left Right   Extension 0 0   Flexion 140 140   Supination     Pronation       Wrist 10/28/2020 10/28/2020   AROM (PROM) Left  Right   Extension 75 70+   Flexion 85 85   RD 25 15   UD 40 50   UD with thumb flexed 30+ 40+     Resisted Testing  Pain Report:  - none    + mild    ++ moderate    +++ severe    10/28/2020 10/28/20    Left Right   Elbow Extension - -   Elbow Flexion - -   Supination  - -   Pronation - -   Wrist Ext with RD, Elbow at side - +   Wrist Ext with UD, Elbow at side - -   Wrist Ext with RD, Elbow Ext - +   Wrist Ext with UD, Elbow Ext - -   Wrist Flex with RD, Elbow at side - -   Wrist Flex with UD, Elbow at side - -   Wrist Flex with RD, Elbow Ext + +   Wrist Flex with UD, Elbow Ext + +   EDC with Elbow at side - -   EDC with Elbow Ext - +   Long Finger Test - +     Resisted Testing  Pain Report: - none  + mild    ++ moderate    +++ severe    10/28/2020 10/28/20    Left Right   APL + ++   EPB + ++   EPL + ++   FCR + +   Radial Deviation + +   Ulnar Deviation - -          Strength   (Measured in pounds)  Pain Report: - none  + mild    ++ moderate    +++ severe    10/28/2020 10/28/2020   Trials Left Right   1  2  3 38  47  52 43  37  46   Average 46 42       10/28/2020 10/28/2020    Left Right   Elbow Ext 55+ 42     Palpation  Pain Report:  - none    + mild    ++ moderate    +++ severe    10/28/2020 10/28/20    Left Right   Pec Major - -   Pec Minor - -   Proximal Triceps - -   Spiral Groove + +   Distal Triceps + +   Anconeus - ++   ECRB at LEP + ++   ECU at LEP + ++   EDC at LEP + ++   Radial Head ++ +   Extensor Wad + -   PIN Site + ++     Palpation:  Pain Report:  - none    + mild    ++ moderate    +++ severe   Date 10/28/2020 10/28/20    Left Right   Bicep Muscle + ++   Distal Bicep Tendon - +   [place] + +   Cubital Tunnel  - -   MEP + +   Flexor Origin + +   Flexor Wad - -   Pronator Teres - -   [place] - -          Palpation:  Pain Report:  - none    + mild    ++ moderate    +++ severe    10/28/2020 10/28/20    Left Right   Radial Styloid + ++   1st DC + ++   FCR + ++   Thumb CMC - -      Special Tests   - none    + mild    ++ moderate    +++ severe      Right 10/28/2020   Elbow Flexion Test -   Tinels Cubital Tunnel -   Tinels [place] -   ULTT Ulnar Nerve      Special Tests   Pain Report:  - none    + mild    ++ moderate    +++ severe    10/28/2020 10/28/20    Left Right   Finkelsteins + ++   Radial Nerve Tinel's (DRSN) - -   Th CMC Grind - -   Th CMC Passive Retropulsion - -   WHAT test + +++         Assessment:  Patient presents with symptoms consistent with diagnosis of DeQuervains tendonitis and elbow pain, with conservative intervention.     Patient's limitations or Problem List includes:  Pain, Decreased ROM/motion, Weakness, Decreased  and pinch strength and tightness in musculature of the bilateral elbow, wrist, hand and thumb which interferes with the patient's ability to perform Self Care Tasks (dressing), Work Tasks, Recreational Activities and Household Chores as compared to previous level of function.    Rehab Potential:  Excellent - Return to full activity, no limitations    Patient will benefit from skilled Occupational Therapy to increase wrist and thumb ROM, flexibility,  strength and pinch strength and decrease pain to return to previous activity level and resume normal daily tasks and to reach their rehab potential.    Barriers to Learning:  No barrier    Communication Issues:  Patient appears to be able to clearly communicate and understand verbal and written communication and follow directions correctly.    Chart Review: Brief history including review of medical and/or therapy records relating to the presenting problem and Simple history review with patient    Identified Performance Deficits: dressing, home establishment and management, meal preparation and cleanup, work and leisure activities    Assessment of Occupational Performance:  3-5 Performance Deficits    Clinical Decision Making (Complexity): Low complexity    Treatment Explanation:  The following has  been discussed with the patient:    RX ordered/plan of care  Anticipated outcomes  Possible risks and side effects    Plan:  Frequency:  1 X week, once daily  Duration:  for 8 weeks    Treatment Plan:    Modalities:    US   Therapeutic Exercise:   AROM of wrist and thumb  PROM with stretch to wrist and thumb extensors   Manual Techniques:   Friction massage over 1st dorsal compartment  Myofascial release of the thumb extensors and flexors  Neuromuscular:   Radial nerve gliding   Victor Manuel taping  Orthotic Fabrication:    Forearm based thumb spica orthosis  Self Care:   Ergonomic consideration   Diagnostic education    Discharge Plan:    Achieve all LTG.  Independent in home treatment program.  Reach maximal therapeutic benefit.    Home Program:   Warmth for stiffness  Ice after activity for pain  Self TFM to 1st dorsal compartment  Self MFR to EPB/ABL  AROM of wrist and thumb  PROM with stretch into simultaneous thumb flexion and ulnar deviation  Thumb spica orthosis for sleeping and per symptoms day  Avoid activities that exacerbate pain in the wrist or thumb  Flexor and extensor wad stretches  MFR to flexors and extensors  TFM to MEP & LEP  Avoid activities that exacerbate pain in the elbow  Lift with forearms in neutral position    Next Visit:  Isometric wrist flexor and pronator strengthening  Progress to eccentric wrist flexor strengthening as tolerated  ECRL strengthening  US  MFR/TFM

## 2020-11-11 ENCOUNTER — THERAPY VISIT (OUTPATIENT)
Dept: OCCUPATIONAL THERAPY | Facility: CLINIC | Age: 30
End: 2020-11-11
Payer: COMMERCIAL

## 2020-11-11 DIAGNOSIS — M79.646 THUMB PAIN, UNSPECIFIED LATERALITY: ICD-10-CM

## 2020-11-11 DIAGNOSIS — M65.4 DE QUERVAIN'S DISEASE (TENOSYNOVITIS): ICD-10-CM

## 2020-11-11 PROCEDURE — 97110 THERAPEUTIC EXERCISES: CPT | Mod: GO | Performed by: OCCUPATIONAL THERAPIST

## 2020-11-11 PROCEDURE — 97026 INFRARED THERAPY: CPT | Mod: GO | Performed by: OCCUPATIONAL THERAPIST

## 2020-11-11 PROCEDURE — 97140 MANUAL THERAPY 1/> REGIONS: CPT | Mod: GO | Performed by: OCCUPATIONAL THERAPIST

## 2020-11-11 PROCEDURE — 97760 ORTHOTIC MGMT&TRAING 1ST ENC: CPT | Mod: GO | Performed by: OCCUPATIONAL THERAPIST

## 2020-11-11 NOTE — PROGRESS NOTES
SOAP Note - Hand Therapy - Objective Information    Current Date:  11/11/2020  Referring Physician: Jeniffer Small MD    Diagnosis: Bilateral elbow tendonitis (right greater than left)  DOI: 10/13/20 (Date of order)    Kimmy Bethea is a 30 year old right hand dominant female.    Patient reports symptoms of pain and weakness/loss of strength of the bilateral elbows which occurred due to overuse and repetitive motions.  S:  Subjective changes as noted by patient: athe arms are much better than last visit. Not as much pain. I wear the brace consistlently during the day and at night this past week.  Functional changes noted by patient: no changes      O:  Pain Level (Scale 0-10):   10/28/2020 11/11/20   At Rest 3/10 3/10   With Use 8/10 4-5/10     Pain Description:  Date 10/28/2020   Location Medial and lateral elbow   Pain Quality Aching, Dull and Sharp   Frequency intermittent     Pain is worst  daytime   Exacerbated by  holding, gripping, elbows flexed, use of right thumb   Relieved by rest and wrist braces, extending elbows   Progression Gradually improving.     Posture  Rounded Forward Shoulders    Sensation  WNL throughout all nerve distributions; per patient report    ROM  Pain Report: - none  + mild    ++ moderate    +++ severe   Elbow 10/28/2020 10/28/2020   AROM (PROM) Left Right   Extension 0 0   Flexion 140 140   Supination     Pronation       Wrist 10/28/2020 10/28/2020   AROM (PROM) Left Right   Extension 75 70+   Flexion 85 85   RD 25 15   UD 40 50   UD with thumb flexed 30+ 40+     Resisted Testing  Pain Report:  - none    + mild    ++ moderate    +++ severe    10/28/2020 10/28/20    Left Right   Elbow Extension - -   Elbow Flexion - -   Supination  - -   Pronation - -   Wrist Ext with RD, Elbow at side - +   Wrist Ext with UD, Elbow at side - -   Wrist Ext with RD, Elbow Ext - +   Wrist Ext with UD, Elbow Ext - -   Wrist Flex with RD, Elbow at side - -   Wrist Flex with UD, Elbow at side - -   Wrist  Flex with RD, Elbow Ext + +   Wrist Flex with UD, Elbow Ext + +   EDC with Elbow at side - -   EDC with Elbow Ext - +   Long Finger Test - +     Resisted Testing  Pain Report: - none  + mild    ++ moderate    +++ severe    10/28/2020 10/28/20    Left Right   APL + ++   EPB + ++   EPL + ++   FCR + +   Radial Deviation + +   Ulnar Deviation - -          Strength   (Measured in pounds)  Pain Report: - none  + mild    ++ moderate    +++ severe    10/28/2020 10/28/2020   Trials Left Right   1  2  3 38  47  52 43  37  46   Average 46 42       10/28/2020 10/28/2020    Left Right   Elbow Ext 55+ 42     Palpation  Pain Report:  - none    + mild    ++ moderate    +++ severe    10/28/2020 10/28/20 11/11/20    Left Right Right   Pec Major - -    Pec Minor - -    Proximal Triceps - -    Spiral Groove + +    Distal Triceps + +    Anconeus - ++    ECRB at LEP + ++    ECU at LEP + ++    EDC at LEP + ++    Radial Head ++ +    Extensor Wad + - ++   PIN Site + ++      Palpation:  Pain Report:  - none    + mild    ++ moderate    +++ severe   Date 10/28/2020 10/28/20    Left Right   Bicep Muscle + ++   Distal Bicep Tendon - +   [place] + +   Cubital Tunnel  - -   MEP + +   Flexor Origin + +   Flexor Wad - -   Pronator Teres - -   [place] - -          Palpation:  Pain Report:  - none    + mild    ++ moderate    +++ severe    10/28/2020 10/28/20    Left Right   Radial Styloid + ++   1st DC + ++   FCR + ++   Thumb CMC - -     Special Tests   - none    + mild    ++ moderate    +++ severe      Right 10/28/2020   Elbow Flexion Test -   T[place]els Cubital Tunnel -   T[place]els [place] Canal -   ULTT Ulnar Nerve      Special Tests   Pain Report:  - none    + mild    ++ moderate    +++ severe    10/28/2020 10/28/20    Left Right   Finkelsteins + ++   Radial Nerve Tinel's (DRSN) - -   Th CMC Grind - -   Th CMC Passive Retropulsion - -   WHAT test + +++     Home Program:   Warmth for stiffness  Ice after activity for pain  Self  TFM to 1st dorsal compartment  Self MFR to EPB/ABL  AROM of wrist and thumb  PROM with stretch into simultaneous thumb flexion and ulnar deviation  Bilateral Thumb spica orthosis for sleeping and per symptoms day  Avoid activities that exacerbate pain in the wrist or thumb  Flexor and extensor wad stretches  MFR to flexors and extensors  TFM to MEP & LEP  Avoid activities that exacerbate pain in the elbow  Lift with forearms in neutral position    Next Visit:  Isometric wrist flexor and pronator strengthening  Progress to eccentric wrist flexor strengthening as tolerated  ECRL strengthening  US/Light therapy  MFR/TFM      Please refer to the daily flowsheet for treatment provided today.

## 2020-11-17 ENCOUNTER — OFFICE VISIT (OUTPATIENT)
Dept: FAMILY MEDICINE | Facility: CLINIC | Age: 30
End: 2020-11-17
Payer: COMMERCIAL

## 2020-11-17 VITALS
BODY MASS INDEX: 22.81 KG/M2 | DIASTOLIC BLOOD PRESSURE: 66 MMHG | TEMPERATURE: 98.8 F | SYSTOLIC BLOOD PRESSURE: 102 MMHG | WEIGHT: 136 LBS | HEART RATE: 81 BPM | OXYGEN SATURATION: 96 %

## 2020-11-17 DIAGNOSIS — N76.0 BV (BACTERIAL VAGINOSIS): ICD-10-CM

## 2020-11-17 DIAGNOSIS — R30.0 DYSURIA: ICD-10-CM

## 2020-11-17 DIAGNOSIS — N89.8 VAGINAL DISCHARGE: Primary | ICD-10-CM

## 2020-11-17 DIAGNOSIS — B96.89 BV (BACTERIAL VAGINOSIS): ICD-10-CM

## 2020-11-17 LAB
ALBUMIN UR-MCNC: NEGATIVE MG/DL
APPEARANCE UR: CLEAR
BACTERIA #/AREA URNS HPF: ABNORMAL /HPF
BILIRUB UR QL STRIP: NEGATIVE
COLOR UR AUTO: YELLOW
GLUCOSE UR STRIP-MCNC: NEGATIVE MG/DL
HGB UR QL STRIP: ABNORMAL
KETONES UR STRIP-MCNC: NEGATIVE MG/DL
LEUKOCYTE ESTERASE UR QL STRIP: ABNORMAL
NITRATE UR QL: POSITIVE
NON-SQ EPI CELLS #/AREA URNS LPF: ABNORMAL /LPF
PH UR STRIP: 7 PH (ref 5–7)
RBC #/AREA URNS AUTO: ABNORMAL /HPF
SOURCE: ABNORMAL
SP GR UR STRIP: 1.01 (ref 1–1.03)
SPECIMEN SOURCE: ABNORMAL
UROBILINOGEN UR STRIP-ACNC: 0.2 EU/DL (ref 0.2–1)
WBC #/AREA URNS AUTO: ABNORMAL /HPF
WET PREP SPEC: ABNORMAL

## 2020-11-17 PROCEDURE — 99213 OFFICE O/P EST LOW 20 MIN: CPT | Performed by: FAMILY MEDICINE

## 2020-11-17 PROCEDURE — 87086 URINE CULTURE/COLONY COUNT: CPT | Performed by: FAMILY MEDICINE

## 2020-11-17 PROCEDURE — 87088 URINE BACTERIA CULTURE: CPT | Performed by: FAMILY MEDICINE

## 2020-11-17 PROCEDURE — 81001 URINALYSIS AUTO W/SCOPE: CPT | Performed by: FAMILY MEDICINE

## 2020-11-17 PROCEDURE — 87210 SMEAR WET MOUNT SALINE/INK: CPT | Performed by: FAMILY MEDICINE

## 2020-11-17 RX ORDER — METRONIDAZOLE 7.5 MG/G
1 GEL VAGINAL DAILY
Qty: 25 G | Refills: 0 | Status: SHIPPED | OUTPATIENT
Start: 2020-11-17 | End: 2020-11-22

## 2020-11-17 NOTE — PROGRESS NOTES
Subjective     Kimmy Bethea is a 30 year old female who presents to clinic today for the following health issues:    HPI         Genitourinary - Female  Onset/Duration: yesterday morning   Description:   Painful urination (Dysuria): YES           Frequency: no  Blood in urine (Hematuria): no  Delay in urine (Hesitency): no  Intensity: severe  Progression of Symptoms:  worsening  Accompanying Signs & Symptoms:  Fever/chills: no  Flank pain: no  Nausea and vomiting: no  Vaginal symptoms: discharge pale yellow and itching  Abdominal/Pelvic Pain: no  History:   History of frequent UTI s: no  History of kidney stones: no  Sexually Active: YES, same partner , single but no using condom this last time , although not concerned with std as they have been together  for a long time   Possibility of pregnancy: No  Precipitating or alleviating factors: None  Therapies tried and outcome: Azo and increase fluid in take , but it did not help     Vaginal Symptoms  Onset/Duration: yesterday   Description:  Vaginal Discharge: white   Itching (Pruritis): YES  Burning sensation:  YES  Odor: no  Accompanying Signs & Symptoms:  Urinary symptoms: YES  Abdominal pain: no  Fever: no  History:   Sexually active: YES  New Partner: no  Possibility of Pregnancy:  no  Recent antibiotic use: no  Previous vaginitis issues: no  Precipitating or alleviating factors: None  Therapies tried and outcome: none          Review of Systems   Constitutional, HEENT, cardiovascular, pulmonary, GI, , musculoskeletal, neuro, skin, endocrine and psych systems are negative, except as otherwise noted.      Objective    There were no vitals taken for this visit.  There is no height or weight on file to calculate BMI.  Physical Exam   GENERAL: healthy, alert and no distress  RESP: lungs clear to auscultation - no rales, rhonchi or wheezes  CV: regular rate and rhythm, normal S1 S2, no S3 or S4,   ABDOMEN: soft, nontender, no hepatosplenomegaly, no masses and bowel  sounds normal  .            Assessment & Plan     (N89.8) Vaginal discharge  (primary encounter diagnosis)  Comment:   Plan: Wet prep            (R30.0) Dysuria  Comment:   Plan: UA with Microscopic reflex to Culture, Urine         Culture Aerobic Bacterial            (N76.0,  B96.89) BV (bacterial vaginosis)  Comment:   Plan: metroNIDAZOLE (METROGEL) 0.75 % vaginal gel            Wet prep shows clue cells. Treatment faxed. Urine suspicious but not too impressive, will await Cx and treat as needed .   Cares and symptomatic treatment discussed. \   follow up if problem         Patient expressed understanding and agreement with treatment plan. All patient's questions were answered, will let me know if has more later.  Medications: Rx's: Reviewed the potential side effects/complications of medications prescribed.    Jeniffer Small MD  [facility]

## 2020-11-18 ENCOUNTER — THERAPY VISIT (OUTPATIENT)
Dept: OCCUPATIONAL THERAPY | Facility: CLINIC | Age: 30
End: 2020-11-18
Payer: COMMERCIAL

## 2020-11-18 DIAGNOSIS — M65.4 DE QUERVAIN'S DISEASE (TENOSYNOVITIS): ICD-10-CM

## 2020-11-18 DIAGNOSIS — M79.646 THUMB PAIN, UNSPECIFIED LATERALITY: ICD-10-CM

## 2020-11-18 PROCEDURE — 97110 THERAPEUTIC EXERCISES: CPT | Mod: GO | Performed by: OCCUPATIONAL THERAPIST

## 2020-11-18 PROCEDURE — 97140 MANUAL THERAPY 1/> REGIONS: CPT | Mod: GO | Performed by: OCCUPATIONAL THERAPIST

## 2020-11-18 PROCEDURE — 97026 INFRARED THERAPY: CPT | Mod: GO | Performed by: OCCUPATIONAL THERAPIST

## 2020-11-19 DIAGNOSIS — N39.0 URINARY TRACT INFECTION WITHOUT HEMATURIA, SITE UNSPECIFIED: Primary | ICD-10-CM

## 2020-11-19 LAB
BACTERIA SPEC CULT: ABNORMAL
BACTERIA SPEC CULT: ABNORMAL
Lab: ABNORMAL
SPECIMEN SOURCE: ABNORMAL

## 2020-11-19 RX ORDER — AMOXICILLIN 875 MG
875 TABLET ORAL 2 TIMES DAILY
Qty: 14 TABLET | Refills: 0 | Status: SHIPPED | OUTPATIENT
Start: 2020-11-19 | End: 2020-11-26

## 2020-12-01 ENCOUNTER — OFFICE VISIT (OUTPATIENT)
Dept: FAMILY MEDICINE | Facility: CLINIC | Age: 30
End: 2020-12-01
Payer: COMMERCIAL

## 2020-12-01 VITALS
HEART RATE: 65 BPM | OXYGEN SATURATION: 97 % | HEIGHT: 65 IN | TEMPERATURE: 99.9 F | DIASTOLIC BLOOD PRESSURE: 60 MMHG | SYSTOLIC BLOOD PRESSURE: 108 MMHG | BODY MASS INDEX: 22.16 KG/M2 | WEIGHT: 133 LBS

## 2020-12-01 DIAGNOSIS — Z30.40 ENCOUNTER FOR SURVEILLANCE OF CONTRACEPTIVES, UNSPECIFIED CONTRACEPTIVE: ICD-10-CM

## 2020-12-01 DIAGNOSIS — Z00.00 ROUTINE GENERAL MEDICAL EXAMINATION AT A HEALTH CARE FACILITY: Primary | ICD-10-CM

## 2020-12-01 DIAGNOSIS — Z11.59 NEED FOR HEPATITIS C SCREENING TEST: ICD-10-CM

## 2020-12-01 PROCEDURE — 86803 HEPATITIS C AB TEST: CPT | Performed by: FAMILY MEDICINE

## 2020-12-01 PROCEDURE — 99395 PREV VISIT EST AGE 18-39: CPT | Performed by: FAMILY MEDICINE

## 2020-12-01 RX ORDER — NORETHINDRONE ACETATE AND ETHINYL ESTRADIOL 1MG-20(21)
1 KIT ORAL DAILY
Qty: 84 TABLET | Refills: 3 | Status: SHIPPED | OUTPATIENT
Start: 2020-12-01 | End: 2021-09-16

## 2020-12-01 ASSESSMENT — MIFFLIN-ST. JEOR: SCORE: 1338.12

## 2020-12-01 NOTE — PROGRESS NOTES
SUBJECTIVE:   CC: Kimmy Bethea is an 30 year old woman who presents for preventive health visit.       Patient has been advised of split billing requirements and indicates understanding: Yes  Healthy Habits:     Getting at least 3 servings of Calcium per day:  NO    Bi-annual eye exam:  Yes    Dental care twice a year:  Yes    Sleep apnea or symptoms of sleep apnea:  None    Diet:  Regular (no restrictions)    Frequency of exercise:  4-5 days/week    Duration of exercise:  30-45 minutes    Taking medications regularly:  Yes    Barriers to taking medications:  None    Medication side effects:  None    PHQ-2 Total Score: 1    Additional concerns today:  No          PROBLEMS TO ADD ON...  Still taking her BCP and doing ok on it     Today's PHQ-2 Score:   PHQ-2 ( 1999 Pfizer) 4/4/2019   Q1: Little interest or pleasure in doing things 0   Q2: Feeling down, depressed or hopeless 0   PHQ-2 Score 0       Abuse: Current or Past (Physical, Sexual or Emotional) - No  Do you feel safe in your environment? Yes        Social History     Tobacco Use     Smoking status: Never Smoker     Smokeless tobacco: Never Used   Substance Use Topics     Alcohol use: Yes     Frequency: 2-3 times a week     Drinks per session: 1 or 2     Comment: 2 drinks per week     If you drink alcohol do you typically have >3 drinks per day or >7 drinks per week? No    No flowsheet data found.No flowsheet data found.    Reviewed orders with patient.  Reviewed health maintenance and updated orders accordingly - Yes  Patient Active Problem List   Diagnosis     Dermatitis     Wrist tendonitis     Bilateral elbow tendonitis     De Quervain's disease (tenosynovitis)     Thumb pain, unspecified laterality     Past Surgical History:   Procedure Laterality Date     NO HISTORY OF SURGERY         Social History     Tobacco Use     Smoking status: Never Smoker     Smokeless tobacco: Never Used   Substance Use Topics     Alcohol use: Yes     Frequency: 2-3 times a  week     Drinks per session: 1 or 2     Comment: 2 drinks per week     Family History   Problem Relation Age of Onset     Hypertension Father      Coronary Artery Disease Other         maternal uncle at age 50+     Hyperlipidemia Mother      Melanoma No family hx of      Skin Cancer No family hx of      Diabetes No family hx of      Cerebrovascular Disease No family hx of      Breast Cancer No family hx of      Colon Cancer No family hx of            Mammogram not appropriate for this patient based on age.    Pertinent mammograms are reviewed under the imaging tab.  History of abnormal Pap smear: NO - age 30- 65 PAP every 3 years recommended  PAP / HPV 4/4/2019   PAP NIL     Reviewed and updated as needed this visit by clinical staff  Tobacco  Allergies  Meds      Soc Hx        Reviewed and updated as needed this visit by Provider                Past Medical History:   Diagnosis Date     Dermatitis 9/23/2016        Review of Systems  CONSTITUTIONAL: NEGATIVE for fever, chills, change in weight  INTEGUMENTARU/SKIN: NEGATIVE for worrisome rashes, moles or lesions  EYES: NEGATIVE for vision changes or irritation  ENT: NEGATIVE for ear, mouth and throat problems  RESP: NEGATIVE for significant cough or SOB  BREAST: NEGATIVE for masses, tenderness or discharge  CV: NEGATIVE for chest pain, palpitations or peripheral edema  GI: NEGATIVE for nausea, abdominal pain, heartburn, or change in bowel habits  : NEGATIVE for unusual urinary or vaginal symptoms. Periods are regular.  MUSCULOSKELETAL: NEGATIVE for significant arthralgias or myalgia  NEURO: NEGATIVE for weakness, dizziness or paresthesias  ENDOCRINE: NEGATIVE for temperature intolerance, skin/hair changes  HEME/ALLERGY/IMMUNE: NEGATIVE for bleeding problems  PSYCHIATRIC: NEGATIVE for changes in mood or affect     OBJECTIVE:   There were no vitals taken for this visit.  Physical Exam  GENERAL: healthy, alert and no distress  EYES: Eyes grossly normal to  inspection, PERRL and conjunctivae and sclerae normal  HENT: ear canals and TM's normal, nose and mouth without ulcers or lesions  NECK: no adenopathy, no asymmetry, masses, or scars and thyroid normal to palpation  RESP: lungs clear to auscultation - no rales, rhonchi or wheezes  BREAST: normal without masses, tenderness or nipple discharge and no palpable axillary masses or adenopathy  CV: regular rate and rhythm, normal S1 S2, no S3 or S4, no murmur, click or rub, no peripheral edema and peripheral pulses strong  ABDOMEN: soft, nontender, no hepatosplenomegaly, no masses and bowel sounds normal   (female): deferred  RECTAL: deferred  MS: no gross musculoskeletal defects noted, no edema  SKIN: no suspicious lesions or rashes  NEURO: Normal strength and tone, mentation intact and speech normal  PSYCH: mentation appears normal, affect normal/bright        ASSESSMENT/PLAN:   (Z00.00) Routine general medical examination at a health care facility  (primary encounter diagnosis)  Comment:   Plan: Lipid panel reflex to direct LDL Fasting,         **Glucose FUTURE anytime, Hepatitis C Screen         Reflex to HCV RNA Quant and Genotype            (Z11.59) Need for hepatitis C screening test  Comment:   Plan: Hepatitis C Screen Reflex to HCV RNA Quant and         Genotype            Check labs.Cares and concerns discussed. follow up if problem   Patient expressed understanding and agreement with treatment plan. All patient's questions were answered, will let me know if has more later.  Medications: Rx's: Reviewed the potential side effects/complications of medications prescribed.       COUNSELING:  Reviewed preventive health counseling, as reflected in patient instructions       Regular exercise       Healthy diet/nutrition       Vision screening       Hearing screening       Immunizations    Vaccinated for: Influenza  Previously            Contraception    Estimated body mass index is 22.81 kg/m  as calculated from the  "following:    Height as of 10/21/19: 1.645 m (5' 4.75\").    Weight as of 11/17/20: 61.7 kg (136 lb).        She reports that she has never smoked. She has never used smokeless tobacco.      Counseling Resources:  ATP IV Guidelines  Pooled Cohorts Equation Calculator  Breast Cancer Risk Calculator  BRCA-Related Cancer Risk Assessment: FHS-7 Tool  FRAX Risk Assessment  ICSI Preventive Guidelines  Dietary Guidelines for Americans, 2010  USDA's MyPlate  ASA Prophylaxis  Lung CA Screening    Jeniffer Small MD  [facility]  "

## 2020-12-01 NOTE — PATIENT INSTRUCTIONS
Patient Education     Prevention Guidelines, Women Ages 18 to 39  Screening tests and vaccines are an important part of managing your health. A screening test is done to find possible disorders or diseases in people who don't have any symptoms. The goal is to find a disease early so lifestyle changes can be made and you can be watched more closely to reduce the risk of disease, or to detect it early enough to treat it most effectively. Screening tests are not considered diagnostic, but are used to determine if more testing is needed. Health counseling is essential, too. Below are guidelines for these, for women ages 18 to 39. Talk with your healthcare provider to make sure you re up-to-date on what you need.  Screening Who needs it How often   Alcohol misuse All women in this age group At routine exams   Blood pressure All women in this age group Yearly checkup if your blood pressure is normal  Normal blood pressure is less than 120/80 mm Hg  If your blood pressure reading is higher than normal, follow the advice of your healthcare provider   Breast cancer All women in this age group should talk with their healthcare providers about the need for clinical breast exams (CBE)1 Clinical breast exam every 3 years1   Cervical cancer Women ages 21 and older Women between ages 21 and 29 should have a Pap test every 3 years; women between ages 30 and 65 are advised to have a Pap test plus an HPV test every 5 years   Chlamydia Sexually active women ages 25 and younger, and women at increased risk for infection (such as having multiple sex partners) Every year if you're at risk or have symptoms   Depression All women in this age group At routine exams   Type 2 diabetes, prediabetes All women with no symptoms who are overweight or obese and have 1 or more other risk factors for diabetes At least every 3 years. Also, testing for diabetes during pregnancy after the 24th week.    Type 2 diabetes, prediabetes All women diagnosed  with gestational diabetes Lifelong testing every 3 years   Type 2 diabetes All women with prediabetes Every year   Gonorrhea Sexually active women at increased risk for infection At routine exams   Hepatitis C Anyone at increased risk At routine exams   HIV All women should be tested at least once for HIV between the ages of 13 and 64 At routine exams. Those with risk factors for HIV should be tested at least annually.    Obesity All women in this age group At routine exams   Syphilis Women at increased risk for infection should talk with their healthcare provider At routine exams   Tuberculosis Women at increased risk for infection should talk with their healthcare provider Ask your healthcare provider   Vision All women in this age group At least 1 complete exam in your 20s, and 2 in your 30s   Vaccine2 Who needs it How often   Chickenpox (varicella) All women in this age group who have no record of this infection or vaccine 2 doses; the second dose should be given 4 to 8 weeks after the first dose   Hepatitis A Women at increased risk for infection should talk with their healthcare provider 2 doses given at least 6 months apart   Hepatitis B Women at increased risk for infection should talk with their healthcare provider 3 doses over 6 months; second dose should be given 1 month after the first dose; the third dose should be given at least 2 months after the second dose and at least 4 months after the first dose   Haemophilus influenzae Type B (HIB) Women at increased risk for infection should talk with their healthcare provider 1 to 3 doses   Human papillomavirus (HPV) All women in this age group up to age 26 3 doses; the second dose should be given 1 to 2 months after the first dose and the third dose given 6 months after the first dose   Influenza (flu) All women in this age group Once a year   Measles, mumps, rubella (MMR) All women in this age group who have no record of these infections or vaccines 1 or 2  doses   Meningococcal Women at increased risk for infection should talk with their healthcare provider 1 or more doses   Pneumococcal conjugate vaccine (PCV13) and pneumococcal polysaccharide vaccine (PPSV23) Women at increased risk for infection should talk with their healthcare provider PCV13: 1 dose ages 19 to 65 (protects against 13 types of pneumococcal bacteria)  PPSV23: 1 to 2 doses through age 64, or 1 dose at 65 or older (protects against 23 types of pneumococcal bacteria)      Tetanus/diphtheria/pertussis (Td/Tdap) booster All women in this age group Td every 10 years, or a one-time dose of Tdap instead of a Td booster after age 18, then Td every 10 years   Counseling Who needs it How often   BRCA gene mutation testing for breast and ovarian cancer susceptibility Women with increased risk for having gene mutation When your risk is known   Breast cancer and chemoprevention Women at high risk for breast cancer When your risk is known   Diet and exercise Women who are overweight or obese When diagnosed, and then at routine exams   Domestic violence Women at the age in which they are able to have children At routine exams   Sexually transmitted infection prevention Women who are sexually active At routine exams   Skin cancer Prevention of skin cancer in fair-skinned adults At routine exams   Use of tobacco and the health effects it can cause All women in this age group Every visit   1 According to the ACS, women ages 20 to 39 years should have a clinical breast exam (CBE) as part of their routine health exam every 3 years. Breast self-exams are an option for women starting in their 20s. But the USPSTF does not recommend CBE.  [employer] last reviewed this educational content on 10/1/2017    [id number] [employer]. [address] [place] [address], [place] [address]. All rights reserved. This information is not intended as a substitute for professional medical care. Always follow your healthcare professional's  instructions.

## 2020-12-02 ENCOUNTER — THERAPY VISIT (OUTPATIENT)
Dept: OCCUPATIONAL THERAPY | Facility: CLINIC | Age: 30
End: 2020-12-02
Payer: COMMERCIAL

## 2020-12-02 DIAGNOSIS — M65.4 DE QUERVAIN'S DISEASE (TENOSYNOVITIS): ICD-10-CM

## 2020-12-02 DIAGNOSIS — M79.646 THUMB PAIN, UNSPECIFIED LATERALITY: ICD-10-CM

## 2020-12-02 LAB — HCV AB SERPL QL IA: NONREACTIVE

## 2020-12-02 PROCEDURE — 97110 THERAPEUTIC EXERCISES: CPT | Mod: GO | Performed by: OCCUPATIONAL THERAPIST

## 2020-12-02 PROCEDURE — 97140 MANUAL THERAPY 1/> REGIONS: CPT | Mod: GO | Performed by: OCCUPATIONAL THERAPIST

## 2020-12-02 PROCEDURE — 97026 INFRARED THERAPY: CPT | Mod: GO | Performed by: OCCUPATIONAL THERAPIST

## 2020-12-02 NOTE — PROGRESS NOTES
SOAP Note - Hand Therapy - Objective Information    Current Date: 12/2/2020  Referring Physician: Jeniffer Small MD    Diagnosis: Bilateral elbow tendonitis (right greater than left)  DOI: 10/13/20 (Date of order)    Kimmy Bethea is a 30 year old right hand dominant female.    Patient reports symptoms of pain and weakness/loss of strength of the bilateral elbows which occurred due to overuse and repetitive motions.  S:  Subjective changes as noted by patient: the arm feels tired and stretched.  I helped a little with moving boxes over the weekend. I can feel more of a strain in the arms.  Still wearing the splints as much as I can.  Especially at night and with activity.  Functional changes noted by patient:       O:  Pain Level (Scale 0-10):   10/28/2020 11/11/20 12/2/20   At Rest 3/10 3/10 1/10   With Use 8/10 4-5/10 2/10     Pain Description:  Date 10/28/2020   Location Medial and lateral elbow   Pain Quality Aching, Dull and Sharp   Frequency intermittent     Pain is worst  daytime   Exacerbated by  holding, gripping, elbows flexed, use of right thumb   Relieved by rest and wrist braces, extending elbows   Progression Gradually improving.     Posture  Rounded Forward Shoulders    Sensation  WNL throughout all nerve distributions; per patient report    ROM  Pain Report: - none  + mild    ++ moderate    +++ severe   Elbow 10/28/2020 10/28/2020   AROM (PROM) Left Right   Extension 0 0   Flexion 140 140   Supination     Pronation       Wrist 10/28/2020 10/28/2020   AROM (PROM) Left Right   Extension 75 70+   Flexion 85 85   RD 25 15   UD 40 50   UD with thumb flexed 30+ 40+     Resisted Testing  Pain Report:  - none    + mild    ++ moderate    +++ severe    10/28/2020 10/28/20 12/2/20 12/2/20    Left Right Left Right   Elbow Extension - -     Elbow Flexion - -     Supination  - -     Pronation - -     Wrist Ext with RD, Elbow at side - +     Wrist Ext with UD, Elbow at side - -     Wrist Ext with RD, Elbow Ext -  + - -   Wrist Ext with UD, Elbow Ext - - - -   Wrist Flex with RD, Elbow at side - -     Wrist Flex with UD, Elbow at side - -     Wrist Flex with RD, Elbow Ext + + - -   Wrist Flex with UD, Elbow Ext + + - -   EDC with Elbow at side - -     EDC with Elbow Ext - +     Long Finger Test - +       Resisted Testing  Pain Report: - none  + mild    ++ moderate    +++ severe    10/28/2020 10/28/20 12/2/20    Left Right Right   APL + ++ +   EPB + ++ -   EPL + ++ +   FCR + + +   Radial Deviation + + -   Ulnar Deviation - - -           Strength   (Measured in pounds)  Pain Report: - none  + mild    ++ moderate    +++ severe    10/28/2020 10/28/2020 12/2/20 12/2/20   Trials Left Right Right Left   1  2  3 38  47  52 43  37  46 15  22  23   45  35  41   Average 46 42 20 40       10/28/2020 10/28/2020    Left Right   Elbow Ext 55+ 42     Palpation  Pain Report:  - none    + mild    ++ moderate    +++ severe    10/28/2020 10/28/20 11/11/20    Left Right Right   Pec Major - -    Pec Minor - -    Proximal Triceps - -    Spiral Groove + +    Distal Triceps + +    Anconeus - ++    ECRB at LEP + ++    ECU at LEP + ++    EDC at LEP + ++    Radial Head ++ +    Extensor Wad + - ++   PIN Site + ++      Palpation:  Pain Report:  - none    + mild    ++ moderate    +++ severe   Date 10/28/2020 10/28/20    Left Right   Bicep Muscle + ++   Distal Bicep Tendon - +   [place] + +   Cubital Tunnel  - -   MEP + +   Flexor Origin + +   Flexor Wad - -   Pronator Teres - -   [place] - -          Palpation:  Pain Report:  - none    + mild    ++ moderate    +++ severe    10/28/2020 10/28/20    Left Right   Radial Styloid + ++   1st DC + ++   FCR + ++   Thumb CMC - -     Special Tests   - none    + mild    ++ moderate    +++ severe      Right 10/28/2020   Elbow Flexion Test -   Tinels Cubital Tunnel -   Tinels [place] -   ULTT Ulnar Nerve      Special Tests   Pain Report:  - none    + mild    ++ moderate    +++ severe     10/28/2020 10/28/20 12/2/20    Left Right Right   Finkelsteins + ++    Radial Nerve Tinel's (DRSN) - -    Th CMC Grind - -    Th CMC Passive Retropulsion - -    WHAT test + +++ ++     Home Program:   Warmth for stiffness  Ice after activity for pain  Self TFM to 1st dorsal compartment  Self MFR to EPB/ABL  AROM of wrist and thumb  PROM with stretch into simultaneous thumb flexion and ulnar deviation  Bilateral Thumb spica orthosis for sleeping and per symptoms day  Avoid activities that exacerbate pain in the wrist or thumb  Flexor and extensor wad stretches  MFR to flexors and extensors  TFM to MEP & LEP  Avoid activities that exacerbate pain in the elbow  Lift with forearms in neutral position  Isometric wrist flexor and pronator strengthening  Wrist extension eccentrics  Dequervains thumb strengthening    Next Visit:  Progress to eccentric wrist flexor strengthening as tolerated  ECRL strengthening  US/Light therapy  MFR/TFM      Please refer to the daily flowsheet for treatment provided today.

## 2021-04-12 NOTE — PROGRESS NOTES
Hand Therapy Progress Note    Reporting Period:  10/28/2020 through 4/15/2021  Referring Physician: Jeniffer Small MD    Diagnosis: Bilateral elbow tendonitis (right greater than left)  DOI: 10/13/20 (Date of order)    Initial History:  Patient reports symptoms of pain and weakness/loss of strength of the bilateral elbows which occurred due to overuse and repetitive motions. Since onset symptoms are Gradually getting better.  Special tests:  none.  Previous treatment: none.    General health as reported by patient is good.  Pertinent medical history includes:None  Medical allergies:none.  Surgical history: none.  Medication history: None.    Occupational Profile Information:  Current occupation is Digital   Currently working in normal job without restrictions  Job Tasks: Computer Work, Lifting, Carrying, Prolonged Sitting  Prior functional level:  no limitations  Barriers include:none  Mobility: No difficulty  Transportation: drives  Leisure activities/hobbies: floral arranging, video games    Upper Extremity Functional Index Score:  SCORE:   Column Totals: /80: 57   (A lower score indicates greater disability.)    Subjective:  Subjective changes as noted by patient: So, I'm still having pain/weakness in my right thumb, but lately I've noticed pain up in my right shoulder and back.  Functional changes noted by patient: No Change to Self Care Tasks (dressing, eating, bathing, hygiene/toileting), and Household Chores  Response to previous treatment: good  Patient has noted adverse reaction to: None    Objective:  Pain Level (Scale 0-10):   10/28/2020 11/11/20 12/2/20 4/15/21   At Rest 3/10 3/10 1/10 0/10   With Use 8/10 4-5/10 2/10 2-3/10     Pain Description:  Date 10/28/2020 4/15/21   Location Medial and lateral elbow R thumb, radiates up to lateral elbow   Pain Quality Aching, Dull and Sharp Fatigue, sharp, aching   Frequency intermittent   intermittent   Pain is worst  daytime daytime    Exacerbated by  holding, gripping, elbows flexed, use of right thumb Mousing, chopping with knife, washing dishes, pinching/gripping/holding   Relieved by rest and wrist braces, extending elbows Self massage with massage gun, splints at night, rest   Progression Gradually improving. Improving in elbows, ongoing in thumb     Posture  Rounded Forward Shoulders    Sensation  WNL throughout all nerve distributions; per patient report     ROM  Pain Report: - none  + mild    ++ moderate    +++ severe   Elbow 10/28/2020 10/28/2020 4/15/21 4/15/21   AROM (PROM) Left Right Left Right   Extension 0 0 0 0   Flexion 140 140 140 140+ in thumb   Supination       Pronation         Wrist 10/28/2020 10/28/2020 4/15/21 4/15/21   AROM (PROM) Left Right Left Right   Extension 75 70+ 81 75   Flexion 85 85 85 85   RD 25 15 20 15   UD 40 50 40 35   UD with thumb flexed 30+ 40+ 15+ tight 15+ tight and pain     ROM  Thumb 4/15/2021 4/15/2021   AROM  (PROM) Left Right   MP -20/71 -25/55   IP +/65 +/67   RABD 40 40+ EPL   PABD 50 35+     Resisted Testing  Pain Report:  - none    + mild    ++ moderate    +++ severe    10/28/2020 10/28/20    Left Right   Elbow Extension - -   Elbow Flexion - -   Supination  - -   Pronation - -   Wrist Ext with RD, Elbow at side - +   Wrist Ext with UD, Elbow at side - -   Wrist Ext with RD, Elbow Ext - +   Wrist Ext with UD, Elbow Ext - -   Wrist Flex with RD, Elbow at side - -   Wrist Flex with UD, Elbow at side - -   Wrist Flex with RD, Elbow Ext + +   Wrist Flex with UD, Elbow Ext + +   EDC with Elbow at side - -   EDC with Elbow Ext - +   Long Finger Test - +     Resisted Testing  Pain Report: - none  + mild    ++ moderate    +++ severe    10/28/2020 10/28/20 4/15/21 4/15/21    Left Right Left Right   APL + ++ - - weak   EPB + ++ + ++ weak   EPL + ++ + + weak   FCR + + - -   Radial Deviation + + NT NT   Ulnar Deviation - - NT NT            Strength   (Measured in pounds)  Pain Report: - none  + mild    ++  moderate    +++ severe    10/28/2020 10/28/2020 4/15/21 4/15/21   Trials Left Right Left Right   1  2  3 38  47  52 43  37  46 20 to pain 18 to pain   Average 46 42 20 18       10/28/2020 10/28/2020 4/15/21 4/15/21    Left Right Left Right   Elbow Ext 55+ 42 30- 34-     Lat Pinch 4/15/2021 4/15/2021   Trials Left Right   1  2  3 10- 6-   Average 10 6     3 Pt Pinch 4/15/2021 4/15/2021   Trials Left Right   1  2  3 7- 4-   Average 7 4       Palpation  Pain Report:  - none    + mild    ++ moderate    +++ severe    10/28/2020 10/28/20 4/15/21 4/15/21    Left Right Left Right   Pec Major - - - -   Pec Minor - - - -   Proximal Triceps - - - -   Spiral Groove + + - -   Distal Triceps + + - -   Anconeus - ++ + -   ECRB at LEP + ++ + +   ECU at LEP + ++ + +   EDC at LEP + ++ + +   Radial Head ++ + - -   Extensor Wad + - + +   PIN Site + ++ + +     Palpation:  Pain Report:  - none    + mild    ++ moderate    +++ severe   Date 10/28/2020 10/28/20 4/15/21 4/15/21    Left Right Left Right   Bicep Muscle + ++ + tight -   Distal Bicep Tendon - + - -   [place] + + - -   Cubital Tunnel  - - + -   MEP + + + +   Flexor Origin + + + +   Flexor Wad - - - -   Pronator Teres - - - -   [place] - - - -            Palpation:  Pain Report:  - none    + mild    ++ moderate    +++ severe    10/28/2020 10/28/20 4/15/21 4/15/21    Left Right Left Right   Radial Styloid + ++ - ++   1st DC + ++ - +   FCR + ++ - -   Thumb CMC - - - -     Special Tests   - none    + mild    ++ moderate    +++ severe      Right 10/28/2020   Elbow Flexion Test -   Tinels Cubital Tunnel -   Tinels [place] -   ULTT Ulnar Nerve      Special Tests   Pain Report:  - none    + mild    ++ moderate    +++ severe    10/28/2020 10/28/20 4/15/21 4/15/21    Left Right Left Right   Finkelsteins + ++ + tight + tight and pain   Radial Nerve Tinel's (DRSN) - - - + pain   Th CMC Grind - - - -   [facility] Passive Retropulsion - - - -   WHAT test + +++        Assessment:  Response to therapy has been improvement to:  Pain:  frequency is less, intensity of pain is decreased, duration of pain is decreased and less tender over affected area  Response to therapy has been lack of progress in: strength, stability and tightness in musculature  Overall Assessment:  Patient would benefit from continued therapy to achieve rehab potential  STG/LTG:  See goal sheet for details and updates of remaining functional limitations.     Plan:  I have re-evaluated this patient and find that the nature, scope, duration and intensity of the therapy is appropriate for the medical condition of the patient.  Frequency:  1 X week, once daily  Duration:  for 8 additional weeks     Home Program:   Epsom Salt Soaks  STM to adductor with clip  STM to FA flexors, extensors and palm with spiky ball  FA extensor stretch  Thumb RABD stretch  Dequervain's Stretch  K-tape trial (Dequervain's method)    Next Visit:  Check response to K-tape  Check splint - patient bringing in for review  Workstation evaluation  Laser  STM - flexors, extensors, adductor, thenars  Stretches  Passive RN glides (add for HEP)  Future visits: strengthening (thumb stability, wrist stability, FA strengthening)    Please refer to the daily flowsheet for treatment provided today.

## 2021-04-15 ENCOUNTER — THERAPY VISIT (OUTPATIENT)
Dept: OCCUPATIONAL THERAPY | Facility: CLINIC | Age: 31
End: 2021-04-15
Payer: COMMERCIAL

## 2021-04-15 DIAGNOSIS — M65.4 DE QUERVAIN'S DISEASE (TENOSYNOVITIS): ICD-10-CM

## 2021-04-15 DIAGNOSIS — M79.646 THUMB PAIN, UNSPECIFIED LATERALITY: ICD-10-CM

## 2021-04-15 PROCEDURE — 97026 INFRARED THERAPY: CPT | Mod: GO | Performed by: OCCUPATIONAL THERAPIST

## 2021-04-15 PROCEDURE — 97110 THERAPEUTIC EXERCISES: CPT | Mod: GO | Performed by: OCCUPATIONAL THERAPIST

## 2021-04-15 PROCEDURE — 97140 MANUAL THERAPY 1/> REGIONS: CPT | Mod: GO | Performed by: OCCUPATIONAL THERAPIST

## 2021-04-19 ENCOUNTER — THERAPY VISIT (OUTPATIENT)
Dept: PHYSICAL THERAPY | Facility: CLINIC | Age: 31
End: 2021-04-19
Payer: COMMERCIAL

## 2021-04-19 DIAGNOSIS — M54.9 UPPER BACK PAIN ON RIGHT SIDE: ICD-10-CM

## 2021-04-19 DIAGNOSIS — G89.29 CHRONIC RIGHT SHOULDER PAIN: ICD-10-CM

## 2021-04-19 DIAGNOSIS — M25.511 CHRONIC RIGHT SHOULDER PAIN: ICD-10-CM

## 2021-04-19 PROCEDURE — 97112 NEUROMUSCULAR REEDUCATION: CPT | Mod: GP | Performed by: PHYSICAL THERAPIST

## 2021-04-19 PROCEDURE — 97161 PT EVAL LOW COMPLEX 20 MIN: CPT | Mod: GP | Performed by: PHYSICAL THERAPIST

## 2021-04-19 PROCEDURE — 97110 THERAPEUTIC EXERCISES: CPT | Mod: GP | Performed by: PHYSICAL THERAPIST

## 2021-04-19 NOTE — PROGRESS NOTES
SOAP Note Objective Information for 4/23/2021:    Pain Level (Scale 0-10):   10/28/2020 11/11/20 12/2/20 4/15/21 4/23/21   At Rest 3/10 3/10 1/10 0/10 4/10   With Use 8/10 4-5/10 2/10 2-3/10 5-6/10     Pain Description:  Date 10/28/2020 4/15/21   Location Medial and lateral elbow R thumb, radiates up to lateral elbow   Pain Quality Aching, Dull and Sharp Fatigue, sharp, aching   Frequency intermittent   intermittent   Pain is worst  daytime daytime   Exacerbated by  holding, gripping, elbows flexed, use of right thumb Mousing, chopping with knife, washing dishes, pinching/gripping/holding   Relieved by rest and wrist braces, extending elbows Self massage with massage gun, splints at night, rest   Progression Gradually improving. Improving in elbows, ongoing in thumb     Home Program:   Epsom Salt Soaks  STM to adductor with clip  STM to FA flexors, extensors and palm with spiky ball  FA extensor stretch  Thumb RABD stretch - 4/23/21 discontinue due to pain  Dequervain's Stretch  K-tape trial (Dequervain's method)  Proximal RN glides    Next Visit:  Foam rolling trial   Laser  STM - flexors, extensors, adductor, thenars  Stretches  Check response to RN glides   Future visits: strengthening (thumb stability, wrist stability, FA strengthening)    Please refer to the daily flowsheet for treatment provided today.

## 2021-04-21 PROBLEM — M25.511 CHRONIC RIGHT SHOULDER PAIN: Status: ACTIVE | Noted: 2021-04-21

## 2021-04-21 PROBLEM — G89.29 CHRONIC RIGHT SHOULDER PAIN: Status: ACTIVE | Noted: 2021-04-21

## 2021-04-21 NOTE — PROGRESS NOTES
Physical Therapy Initial Evaluation    Physical Therapy Initial Examination/Evaluation  April 19, 2021    Kimmy Bethea  is a 30 year old  female referred to physical therapy by Dr. Small for treatment of R upper back pain.      DOI/onset 2-19-21  Mechanism of injury Patient has had a gradual onset of R upper back pain x 2 months w/out incident. She feels it is related to working from home on the computer with a less than ideal ergonomic set up.   DOS n/a  Prior treatment currently receiving hand therapy for B thumb problems. Effect of prior treatment helpful    Chief Complaint:   Limited computer tolerance due to pain.   Pain location: R scapular region  Quality: sharp  Constant/Intermittent: intermittent  Time of day: worse as day progresses  Symptoms have worsened since onset.    Current pain 3/10.  Pain at best 1/10.  Pain at worst 5/10.    Symptoms aggravated by computer use, cooking, lifting.    Symptoms improved with walking, moving.     Social history:  Lives alone in an apartment.    Occupation: .  Job duties:  Computer work. Prolonged sitting.    Patient having difficulty with ADLs: cooking. cleaning.    Patient's goals are to reduce pain.    Patient reports general health as good.  Related medical history no previous hx.    Surgical History:  none.    Imaging: none.    Medications:  none.      Return to MD:  As needed.      Clinical Impression: Patient should respond well to continued PT intervention including manual therapy, therapeutic exercise, and ergonomic training.    Subjective:  HPI                    Objective:  Standing Alignment:    Cervical/Thoracic:  Forward head  Shoulder/UE:  Rounded shoulders                  Flexibility/Screens:   Positive screens:  Shoulder                             Shoulder Evaluation:  ROM:  AROM:  normal (cervical ROM WNL)                                  Strength:    Flexion: Right: 4-/5     Pain:   Extension:  Right: 4-/5    Pain:  Abduction:   Right: 4-/5     Pain:    Internal Rotation:  Right: 4+/5     Pain:  External Rotation:   Right:4-/5     Pain:    Horizontal Abduction:  Right:4-/5    Pain:          Special Tests:        Right shoulder negative for the following special tests:Labral; Impingement and Rotator cuff tear  Palpation:    Left shoulder tenderness present at:  Infraspinatus; Rhomboids and Upper Trap    Mobility Tests:  normal                                                 General     ROS    Assessment/Plan:    Patient is a 30 year old female with right side shoulder complaints.    Patient has the following significant findings with corresponding treatment plan.                Diagnosis 1:  R shoulder/upper back pain  Pain -  manual therapy, self management and education  Decreased strength - therapeutic exercise and therapeutic activities  Impaired muscle performance - neuro re-education  Decreased function - therapeutic activities  Impaired posture - neuro re-education    Therapy Evaluation Codes:   1) History comprised of:   Personal factors that impact the plan of care:      None.    Comorbidity factors that impact the plan of care are:      None.     Medications impacting care: None.  2) Examination of Body Systems comprised of:   Body structures and functions that impact the plan of care:      Shoulder.   Activity limitations that impact the plan of care are:      Cooking and Reading/Computer work.  3) Clinical presentation characteristics are:   Stable/Uncomplicated.  4) Decision-Making    Low complexity using standardized patient assessment instrument and/or measureable assessment of functional outcome.  Cumulative Therapy Evaluation is: Low complexity.    Previous and current functional limitations:  (See Goal Flow Sheet for this information)    Short term and Long term goals: (See Goal Flow Sheet for this information)     Communication ability:  Patient appears to be able to clearly communicate and understand verbal and written  communication and follow directions correctly.  Treatment Explanation - The following has been discussed with the patient:   RX ordered/plan of care  Anticipated outcomes  Possible risks and side effects  This patient would benefit from PT intervention to resume normal activities.   Rehab potential is good.    Frequency:  1 X week, once daily  Duration:  for 6 weeks  Discharge Plan:  Achieve all LTG.  Independent in home treatment program.  Reach maximal therapeutic benefit.    Please refer to the daily flowsheet for treatment today, total treatment time and time spent performing 1:1 timed codes.

## 2021-04-21 NOTE — PROGRESS NOTES
Physical Therapy Initial Evaluation  Subjective:    Patient Health History                         Current medications: Hormonal Birth control.    Current occupation is Comms Coordinator.   Primary job tasks include:  Computer work and prolonged sitting.                                    Objective:  System    Physical Exam    General     ROS    Assessment/Plan:

## 2021-04-23 ENCOUNTER — THERAPY VISIT (OUTPATIENT)
Dept: OCCUPATIONAL THERAPY | Facility: CLINIC | Age: 31
End: 2021-04-23
Payer: COMMERCIAL

## 2021-04-23 DIAGNOSIS — M65.4 DE QUERVAIN'S DISEASE (TENOSYNOVITIS): ICD-10-CM

## 2021-04-23 DIAGNOSIS — M79.646 THUMB PAIN, UNSPECIFIED LATERALITY: ICD-10-CM

## 2021-04-23 PROCEDURE — 97140 MANUAL THERAPY 1/> REGIONS: CPT | Mod: GO | Performed by: OCCUPATIONAL THERAPIST

## 2021-04-23 PROCEDURE — 97110 THERAPEUTIC EXERCISES: CPT | Mod: GO | Performed by: OCCUPATIONAL THERAPIST

## 2021-04-23 PROCEDURE — 97112 NEUROMUSCULAR REEDUCATION: CPT | Mod: GO | Performed by: OCCUPATIONAL THERAPIST

## 2021-04-23 PROCEDURE — 97026 INFRARED THERAPY: CPT | Mod: GO | Performed by: OCCUPATIONAL THERAPIST

## 2021-04-27 NOTE — PROGRESS NOTES
SOAP Note Objective Information for 4/30/2021:    Pain Level (Scale 0-10):   10/28/2020 11/11/20 12/2/20 4/15/21 4/23/21 4/30/21   At Rest 3/10 3/10 1/10 0/10 4/10 3/10   With Use 8/10 4-5/10 2/10 2-3/10 5-6/10 4/10     Pain Description:  Date 10/28/2020 4/15/21   Location Medial and lateral elbow R thumb, radiates up to lateral elbow   Pain Quality Aching, Dull and Sharp Fatigue, sharp, aching   Frequency intermittent   intermittent   Pain is worst  daytime daytime   Exacerbated by  holding, gripping, elbows flexed, use of right thumb Mousing, chopping with knife, washing dishes, pinching/gripping/holding   Relieved by rest and wrist braces, extending elbows Self massage with massage gun, splints at night, rest   Progression Gradually improving. Improving in elbows, ongoing in thumb     Home Program:   Epsom Salt Soaks  STM to adductor with clip  STM to FA flexors, extensors and palm with spiky ball  FA extensor stretch  Thumb RABD stretch - 4/23/21 discontinue due to pain  Dequervain's Stretch  K-tape trial (Dequervain's method)  Proximal RN glides  Foam rolling to lats    Next Visit:  Check response to foam rolling   Laser  STM - flexors, extensors, adductor, thenars  Stretches  Check response to RN glides   Future visits: strengthening (thumb stability, wrist stability, FA strengthening)    Please refer to the daily flowsheet for treatment provided today.

## 2021-04-30 ENCOUNTER — THERAPY VISIT (OUTPATIENT)
Dept: PHYSICAL THERAPY | Facility: CLINIC | Age: 31
End: 2021-04-30
Payer: COMMERCIAL

## 2021-04-30 ENCOUNTER — THERAPY VISIT (OUTPATIENT)
Dept: OCCUPATIONAL THERAPY | Facility: CLINIC | Age: 31
End: 2021-04-30
Payer: COMMERCIAL

## 2021-04-30 DIAGNOSIS — M65.4 DE QUERVAIN'S DISEASE (TENOSYNOVITIS): ICD-10-CM

## 2021-04-30 DIAGNOSIS — G89.29 CHRONIC RIGHT SHOULDER PAIN: ICD-10-CM

## 2021-04-30 DIAGNOSIS — M79.646 THUMB PAIN, UNSPECIFIED LATERALITY: ICD-10-CM

## 2021-04-30 DIAGNOSIS — M25.511 CHRONIC RIGHT SHOULDER PAIN: ICD-10-CM

## 2021-04-30 PROCEDURE — 97110 THERAPEUTIC EXERCISES: CPT | Mod: GP | Performed by: PHYSICAL THERAPIST

## 2021-04-30 PROCEDURE — 97140 MANUAL THERAPY 1/> REGIONS: CPT | Mod: GP | Performed by: PHYSICAL THERAPIST

## 2021-04-30 PROCEDURE — 97026 INFRARED THERAPY: CPT | Mod: GO | Performed by: OCCUPATIONAL THERAPIST

## 2021-04-30 PROCEDURE — 97110 THERAPEUTIC EXERCISES: CPT | Mod: GO | Performed by: OCCUPATIONAL THERAPIST

## 2021-04-30 PROCEDURE — 97112 NEUROMUSCULAR REEDUCATION: CPT | Mod: GO | Performed by: OCCUPATIONAL THERAPIST

## 2021-04-30 PROCEDURE — 97530 THERAPEUTIC ACTIVITIES: CPT | Mod: GP | Performed by: PHYSICAL THERAPIST

## 2021-04-30 PROCEDURE — 97140 MANUAL THERAPY 1/> REGIONS: CPT | Mod: GO | Performed by: OCCUPATIONAL THERAPIST

## 2021-04-30 NOTE — PROGRESS NOTES
Subjective:  HPI  Physical Exam                    Objective:  System    Physical Exam    General     ROS    Assessment/Plan:    SUBJECTIVE  Subjective changes as noted by pt:   Pt. reports mod improvement since last visit and feels the ex's really help   Current pain level:  4/10   Changes in function:  Yes (See Goal flowsheet attached for changes in current functional level)     Adverse reaction to treatment or activity:  None    OBJECTIVE  Changes in objective findings:  Strength - R sh hor abd 4-/5; ext 4-/5. Palpation - MF tightness/tenderness in R scap musculature.     ASSESSMENT  Kimmy continues to require intervention to meet STG and LTG's: PT  Patient is progressing as expected.  Response to therapy has shown an improvement in  pain level  Progress made towards STG/LTG?  Yes (See Goal flowsheet attached for updates on achievement of STG and LTG)    PLAN  Current treatment program is being advanced to more complex exercises.    PTA/ATC plan:  N/A    Please refer to the daily flowsheet for treatment today, total treatment time and time spent performing 1:1 timed codes.

## 2021-05-03 NOTE — PROGRESS NOTES
SOAP Note Objective Information for 5/7/2021:    Pain Level (Scale 0-10):   10/28/2020 11/11/20 12/2/20 4/15/21 4/23/21 4/30/21 5/7/21   At Rest 3/10 3/10 1/10 0/10 4/10 3/10 2/10   With Use 8/10 4-5/10 2/10 2-3/10 5-6/10 4/10 4-5/10     Pain Description:  Date 10/28/2020 4/15/21   Location Medial and lateral elbow R thumb, radiates up to lateral elbow   Pain Quality Aching, Dull and Sharp Fatigue, sharp, aching   Frequency intermittent   intermittent   Pain is worst  daytime daytime   Exacerbated by  holding, gripping, elbows flexed, use of right thumb Mousing, chopping with knife, washing dishes, pinching/gripping/holding   Relieved by rest and wrist braces, extending elbows Self massage with massage gun, splints at night, rest   Progression Gradually improving. Improving in elbows, ongoing in thumb     Home Program:   Epsom Salt Soaks  STM to adductor with clip  STM to FA flexors, extensors and palm with spiky ball  FA extensor stretch  Thumb RABD stretch - 4/23/21 discontinue due to pain  Dequervain's Stretch  K-tape trial (Dequervain's method)  Proximal RN glides  Foam rolling to lats and pecs  Foam roller stretch - pec minor    Next Visit:  Check response to foam rolling   Laser  STM - flexors, extensors, adductor, thenars  Stretches  Future visits: strengthening (thumb stability, wrist stability, FA strengthening)    Please refer to the daily flowsheet for treatment provided today.

## 2021-05-07 ENCOUNTER — THERAPY VISIT (OUTPATIENT)
Dept: OCCUPATIONAL THERAPY | Facility: CLINIC | Age: 31
End: 2021-05-07
Payer: COMMERCIAL

## 2021-05-07 ENCOUNTER — THERAPY VISIT (OUTPATIENT)
Dept: PHYSICAL THERAPY | Facility: CLINIC | Age: 31
End: 2021-05-07
Payer: COMMERCIAL

## 2021-05-07 DIAGNOSIS — G89.29 CHRONIC RIGHT SHOULDER PAIN: ICD-10-CM

## 2021-05-07 DIAGNOSIS — M79.646 THUMB PAIN, UNSPECIFIED LATERALITY: ICD-10-CM

## 2021-05-07 DIAGNOSIS — M25.511 CHRONIC RIGHT SHOULDER PAIN: ICD-10-CM

## 2021-05-07 DIAGNOSIS — M65.4 DE QUERVAIN'S DISEASE (TENOSYNOVITIS): ICD-10-CM

## 2021-05-07 PROCEDURE — 97530 THERAPEUTIC ACTIVITIES: CPT | Mod: GP | Performed by: PHYSICAL THERAPIST

## 2021-05-07 PROCEDURE — 97140 MANUAL THERAPY 1/> REGIONS: CPT | Mod: GO | Performed by: OCCUPATIONAL THERAPIST

## 2021-05-07 PROCEDURE — 97026 INFRARED THERAPY: CPT | Mod: GO | Performed by: OCCUPATIONAL THERAPIST

## 2021-05-07 PROCEDURE — 97112 NEUROMUSCULAR REEDUCATION: CPT | Mod: GO | Performed by: OCCUPATIONAL THERAPIST

## 2021-05-07 PROCEDURE — 97110 THERAPEUTIC EXERCISES: CPT | Mod: GP | Performed by: PHYSICAL THERAPIST

## 2021-05-07 PROCEDURE — 97140 MANUAL THERAPY 1/> REGIONS: CPT | Mod: GP | Performed by: PHYSICAL THERAPIST

## 2021-05-07 PROCEDURE — 97110 THERAPEUTIC EXERCISES: CPT | Mod: GO | Performed by: OCCUPATIONAL THERAPIST

## 2021-05-10 NOTE — PROGRESS NOTES
SOAP Note Objective Information for 5/14/2021:    Pain Level (Scale 0-10):   10/28/2020 11/11/20 12/2/20 4/15/21 4/23/21 4/30/21 5/7/21   At Rest 3/10 3/10 1/10 0/10 4/10 3/10 2/10   With Use 8/10 4-5/10 2/10 2-3/10 5-6/10 4/10 4-5/10     Pain Level (Scale 0-10):   5/14/21         At Rest 5         With Use 7           Pain Description:  Date 10/28/2020 4/15/21   Location Medial and lateral elbow R thumb, radiates up to lateral elbow   Pain Quality Aching, Dull and Sharp Fatigue, sharp, aching   Frequency intermittent   intermittent   Pain is worst  daytime daytime   Exacerbated by  holding, gripping, elbows flexed, use of right thumb Mousing, chopping with knife, washing dishes, pinching/gripping/holding   Relieved by rest and wrist braces, extending elbows Self massage with massage gun, splints at night, rest   Progression Gradually improving. Improving in elbows, ongoing in thumb     Special Tests:   5/14/20 Date 5/14/20   Right Side Left   - Tinels CT -   - Tinels PIN -   - Tinels DSRN -   - Tinels cubital tunnel + tingle and pain   - Tinels [place] -     Palpation: Tender in bilateral MEP/flexor wad    Home Program:   Epsom Salt Soaks  STM to adductor with clip  STM to FA flexors, extensors and palm with spiky ball  FA extensor stretch  Thumb RABD stretch - 4/23/21 discontinue due to pain  Dequervain's Stretch  K-tape trial (Dequervain's method)  Proximal RN glides  Foam rolling to lats and pecs  Foam roller stretch - pec minor    Next Visit:  Workstation review  Laser  STM - flexors, extensors, adductor, thenars  Stretches  Future visits: strengthening (thumb stability, wrist stability, FA strengthening)    Please refer to the daily flowsheet for treatment provided today.

## 2021-05-14 ENCOUNTER — THERAPY VISIT (OUTPATIENT)
Dept: OCCUPATIONAL THERAPY | Facility: CLINIC | Age: 31
End: 2021-05-14
Payer: COMMERCIAL

## 2021-05-14 ENCOUNTER — THERAPY VISIT (OUTPATIENT)
Dept: PHYSICAL THERAPY | Facility: CLINIC | Age: 31
End: 2021-05-14
Payer: COMMERCIAL

## 2021-05-14 DIAGNOSIS — M65.4 DE QUERVAIN'S DISEASE (TENOSYNOVITIS): ICD-10-CM

## 2021-05-14 DIAGNOSIS — M79.646 THUMB PAIN, UNSPECIFIED LATERALITY: ICD-10-CM

## 2021-05-14 DIAGNOSIS — M25.511 CHRONIC RIGHT SHOULDER PAIN: ICD-10-CM

## 2021-05-14 DIAGNOSIS — G89.29 CHRONIC RIGHT SHOULDER PAIN: ICD-10-CM

## 2021-05-14 PROCEDURE — 97026 INFRARED THERAPY: CPT | Mod: GO | Performed by: OCCUPATIONAL THERAPIST

## 2021-05-14 PROCEDURE — 97110 THERAPEUTIC EXERCISES: CPT | Performed by: PHYSICAL THERAPIST

## 2021-05-14 PROCEDURE — 97110 THERAPEUTIC EXERCISES: CPT | Mod: GO | Performed by: OCCUPATIONAL THERAPIST

## 2021-05-14 PROCEDURE — 97530 THERAPEUTIC ACTIVITIES: CPT | Mod: 59 | Performed by: PHYSICAL THERAPIST

## 2021-05-14 PROCEDURE — 97112 NEUROMUSCULAR REEDUCATION: CPT | Mod: GO | Performed by: OCCUPATIONAL THERAPIST

## 2021-05-14 PROCEDURE — 97140 MANUAL THERAPY 1/> REGIONS: CPT | Performed by: PHYSICAL THERAPIST

## 2021-05-14 PROCEDURE — 97140 MANUAL THERAPY 1/> REGIONS: CPT | Mod: GO | Performed by: OCCUPATIONAL THERAPIST

## 2021-05-14 NOTE — PROGRESS NOTES
Subjective:  HPI  Physical Exam                    Objective:  System    Physical Exam    General     ROS    Assessment/Plan:    SUBJECTIVE  Subjective changes as noted by pt:   Pt. had an exacerbation in R upper back and arm pain this week as she had to do increased computer work to cover for a coworker who was off all week. She did a short bike ride last weekend without pain.   Current pain level:  4/10   Changes in function:  Yes (See Goal flowsheet attached for changes in current functional level)     Adverse reaction to treatment or activity:  None    OBJECTIVE  Changes in objective findings:  Strength R sh flex 4+/5; abd 4/5; ER 4-/5; hor abd 4-/5; ext 4-/5     ASSESSMENT  Kimmy continues to require intervention to meet STG and LTG's: PT  Patient has experienced an exacerbation of symptoms.  Response to therapy has shown an improvement in  pain level and ROM   Progress made towards STG/LTG?  Yes (See Goal flowsheet attached for updates on achievement of STG and LTG)    PLAN  Current treatment program is being advanced to more complex exercises.    PTA/ATC plan:  N/A    Please refer to the daily flowsheet for treatment today, total treatment time and time spent performing 1:1 timed codes.

## 2021-05-17 NOTE — PROGRESS NOTES
----- Message from Mary Edwards MA sent at 5/3/2018  1:37 PM CDT -----  Contact: self      ----- Message -----  From: Amira Crenshaw  Sent: 5/3/2018   1:31 PM  To: Michelle Benitez Staff    Pt called stating she seen Elibenjamin Metzger Franck in November 2017. Domenica told pt that if pt feels the need to get an MRI done on both knees to call and request it. Pt would like to know if it is possible for her to have the order for MRI for both knees and have her MRI done before her appt. She stated she is experiencing a good amount of pain in both knees. please call pt regarding this. [phone]   SOAP Note Objective Information for 5/21/2021:    Pain Level (Scale 0-10):   10/28/2020 11/11/20 12/2/20 4/15/21 4/23/21 4/30/21 5/7/21   At Rest 3/10 3/10 1/10 0/10 4/10 3/10 2/10   With Use 8/10 4-5/10 2/10 2-3/10 5-6/10 4/10 4-5/10     Pain Level (Scale 0-10):   5/14/21 5/21/21        At Rest 5 1        With Use 7 2          Pain Description:  Date 10/28/2020 4/15/21   Location Medial and lateral elbow R thumb, radiates up to lateral elbow   Pain Quality Aching, Dull and Sharp Fatigue, sharp, aching   Frequency intermittent   intermittent   Pain is worst  daytime daytime   Exacerbated by  holding, gripping, elbows flexed, use of right thumb Mousing, chopping with knife, washing dishes, pinching/gripping/holding   Relieved by rest and wrist braces, extending elbows Self massage with massage gun, splints at night, rest   Progression Gradually improving. Improving in elbows, ongoing in thumb     Home Program:   Epsom Salt Soaks  STM to adductor with clip  STM to FA flexors, extensors and palm with spiky ball  FA extensor stretch  Thumb RABD stretch - 4/23/21 discontinue due to pain  Dequervain's Stretch  K-tape trial (Dequervain's method)  Proximal RN glides  Foam rolling to lats and pecs  Foam roller stretch - pec minor  Workstation recommendations: vertical mouse, kinesis keyboard, new adjustable desk    Next Visit:  Strengthening!  Laser  STM - flexors, extensors, adductor, thenars  Stretches    Please refer to the daily flowsheet for treatment provided today.

## 2021-05-21 ENCOUNTER — THERAPY VISIT (OUTPATIENT)
Dept: PHYSICAL THERAPY | Facility: CLINIC | Age: 31
End: 2021-05-21
Payer: COMMERCIAL

## 2021-05-21 ENCOUNTER — THERAPY VISIT (OUTPATIENT)
Dept: OCCUPATIONAL THERAPY | Facility: CLINIC | Age: 31
End: 2021-05-21
Payer: COMMERCIAL

## 2021-05-21 DIAGNOSIS — G89.29 CHRONIC RIGHT SHOULDER PAIN: ICD-10-CM

## 2021-05-21 DIAGNOSIS — M25.511 CHRONIC RIGHT SHOULDER PAIN: ICD-10-CM

## 2021-05-21 DIAGNOSIS — M79.646 THUMB PAIN, UNSPECIFIED LATERALITY: ICD-10-CM

## 2021-05-21 DIAGNOSIS — M65.4 DE QUERVAIN'S DISEASE (TENOSYNOVITIS): ICD-10-CM

## 2021-05-21 PROCEDURE — 97140 MANUAL THERAPY 1/> REGIONS: CPT | Performed by: PHYSICAL THERAPIST

## 2021-05-21 PROCEDURE — 97530 THERAPEUTIC ACTIVITIES: CPT | Mod: GO | Performed by: OCCUPATIONAL THERAPIST

## 2021-05-21 PROCEDURE — 97112 NEUROMUSCULAR REEDUCATION: CPT | Mod: GO | Performed by: OCCUPATIONAL THERAPIST

## 2021-05-21 PROCEDURE — 97110 THERAPEUTIC EXERCISES: CPT | Performed by: PHYSICAL THERAPIST

## 2021-05-21 PROCEDURE — 97140 MANUAL THERAPY 1/> REGIONS: CPT | Mod: GO | Performed by: OCCUPATIONAL THERAPIST

## 2021-05-21 PROCEDURE — 97026 INFRARED THERAPY: CPT | Mod: GO | Performed by: OCCUPATIONAL THERAPIST

## 2021-05-21 PROCEDURE — 97530 THERAPEUTIC ACTIVITIES: CPT | Mod: 59 | Performed by: PHYSICAL THERAPIST

## 2021-05-24 NOTE — PROGRESS NOTES
SOAP Note Objective Information for 5/28/2021:    Pain Level (Scale 0-10):   10/28/2020 11/11/20 12/2/20 4/15/21 4/23/21 4/30/21 5/7/21   At Rest 3/10 3/10 1/10 0/10 4/10 3/10 2/10   With Use 8/10 4-5/10 2/10 2-3/10 5-6/10 4/10 4-5/10     Pain Level (Scale 0-10):   5/14/21 5/21/21 5/28/21       At Rest 5 1 0       With Use 7 2 1         Pain Description:  Date 10/28/2020 4/15/21   Location Medial and lateral elbow R thumb, radiates up to lateral elbow   Pain Quality Aching, Dull and Sharp Fatigue, sharp, aching   Frequency intermittent   intermittent   Pain is worst  daytime daytime   Exacerbated by  holding, gripping, elbows flexed, use of right thumb Mousing, chopping with knife, washing dishes, pinching/gripping/holding   Relieved by rest and wrist braces, extending elbows Self massage with massage gun, splints at night, rest   Progression Gradually improving. Improving in elbows, ongoing in thumb     Home Program:   Eps[employer] Salt Soaks  STM to adductor with clip  STM to FA flexors, extensors and palm with spiky ball  FA extensor stretch  Thumb RABD stretch - 4/23/21 discontinue due to pain  Dequervain's Stretch  K-tape trial (Dequervain's method)  Proximal RN glides  Foam rolling to lats and pecs  Foam roller stretch - pec minor  Workstation recommendations: vertical mouse, kinesis keyboard, new adjustable desk  Strengthening 3 x week:  Wrist isotonics with 2lbs (flexion/extension)   strengthening with ball    Next Visit:  Check response to strengthening  Laser  STM - flexors, extensors, adductor, thenars  Stretches    Please refer to the daily flowsheet for treatment provided today.

## 2021-05-28 ENCOUNTER — THERAPY VISIT (OUTPATIENT)
Dept: PHYSICAL THERAPY | Facility: CLINIC | Age: 31
End: 2021-05-28
Payer: COMMERCIAL

## 2021-05-28 ENCOUNTER — THERAPY VISIT (OUTPATIENT)
Dept: OCCUPATIONAL THERAPY | Facility: CLINIC | Age: 31
End: 2021-05-28
Payer: COMMERCIAL

## 2021-05-28 DIAGNOSIS — M25.511 CHRONIC RIGHT SHOULDER PAIN: ICD-10-CM

## 2021-05-28 DIAGNOSIS — G89.29 CHRONIC RIGHT SHOULDER PAIN: ICD-10-CM

## 2021-05-28 DIAGNOSIS — M65.4 DE QUERVAIN'S DISEASE (TENOSYNOVITIS): ICD-10-CM

## 2021-05-28 DIAGNOSIS — M79.646 THUMB PAIN, UNSPECIFIED LATERALITY: ICD-10-CM

## 2021-05-28 PROCEDURE — 97110 THERAPEUTIC EXERCISES: CPT | Mod: GP | Performed by: PHYSICAL THERAPIST

## 2021-05-28 PROCEDURE — 97110 THERAPEUTIC EXERCISES: CPT | Mod: GO | Performed by: OCCUPATIONAL THERAPIST

## 2021-05-28 PROCEDURE — 97140 MANUAL THERAPY 1/> REGIONS: CPT | Mod: GP | Performed by: PHYSICAL THERAPIST

## 2021-05-28 PROCEDURE — 97140 MANUAL THERAPY 1/> REGIONS: CPT | Mod: GO | Performed by: OCCUPATIONAL THERAPIST

## 2021-05-28 PROCEDURE — 97112 NEUROMUSCULAR REEDUCATION: CPT | Mod: GO | Performed by: OCCUPATIONAL THERAPIST

## 2021-05-28 PROCEDURE — 97530 THERAPEUTIC ACTIVITIES: CPT | Mod: GP | Performed by: PHYSICAL THERAPIST

## 2021-05-28 PROCEDURE — 97026 INFRARED THERAPY: CPT | Mod: GO | Performed by: OCCUPATIONAL THERAPIST

## 2021-05-28 NOTE — PROGRESS NOTES
Subjective:  HPI  Physical Exam                    Objective:  System    Physical Exam    General     ROS    Assessment/Plan:    SUBJECTIVE  Subjective changes as noted by pt:   Pt. had a flareup this past weekend after biking longer than normal which ended up being 5 miles each way. She was encouraged however that the pain was not as great as other recent flareups and recovered much quicker than usual. She cont to work on adjusting her standing desk at home.   Current pain level: 3/10   Changes in function:  Yes (See Goal flowsheet attached for changes in current functional level)     Adverse reaction to treatment or activity:  None    OBJECTIVE  Changes in objective findings:  Strength R sh hor abd 4-/5; ext 4/5; ER 4/5     ASSESSMENT  Kimmy continues to require intervention to meet STG and LTG's: PT  Patient's symptoms are resolving.  Response to therapy has shown an improvement in  pain level and strength  Progress made towards STG/LTG?  Yes (See Goal flowsheet attached for updates on achievement of STG and LTG)    PLAN  Current treatment program is being advanced to more complex exercises.    PTA/ATC plan:  N/A    Please refer to the daily flowsheet for treatment today, total treatment time and time spent performing 1:1 timed codes.

## 2021-06-01 NOTE — PROGRESS NOTES
SOAP Note Objective Information for 6/4/2021:    Pain Level (Scale 0-10):   10/28/2020 11/11/20 12/2/20 4/15/21 4/23/21 4/30/21 5/7/21   At Rest 3/10 3/10 1/10 0/10 4/10 3/10 2/10   With Use 8/10 4-5/10 2/10 2-3/10 5-6/10 4/10 4-5/10     Pain Level (Scale 0-10):   5/14/21 5/21/21 5/28/21 6/4/21      At Rest 5 1 0 2      With Use 7 2 1 3        Pain Description:  Date 10/28/2020 4/15/21   Location Medial and lateral elbow R thumb, radiates up to lateral elbow   Pain Quality Aching, Dull and Sharp Fatigue, sharp, aching   Frequency intermittent   intermittent   Pain is worst  daytime daytime   Exacerbated by  holding, gripping, elbows flexed, use of right thumb Mousing, chopping with knife, washing dishes, pinching/gripping/holding   Relieved by rest and wrist braces, extending elbows Self massage with massage gun, splints at night, rest   Progression Gradually improving. Improving in elbows, ongoing in thumb     Home Program:   E[employer] Salt Soaks  STM to adductor with clip  STM to FA flexors, extensors and palm with spiky ball  FA extensor stretch  Thumb RABD stretch - 4/23/21 discontinue due to pain  Dequervain's Stretch  K-tape trial (Dequervain's method)  Proximal RN glides  Foam rolling to lats and pecs  Foam roller stretch - pec minor  Workstation recommendations: vertical mouse, kinesis keyboard, new adjustable desk  Strengthening 3 x week:  Wrist isotonics with 2lbs (flexion/extension)   strengthening with ball    Next Visit:  Check response to strengthening  Laser  STM - flexors, extensors, adductor, thenars  Stretches    Please refer to the daily flowsheet for treatment provided today.

## 2021-06-04 ENCOUNTER — THERAPY VISIT (OUTPATIENT)
Dept: OCCUPATIONAL THERAPY | Facility: CLINIC | Age: 31
End: 2021-06-04
Payer: COMMERCIAL

## 2021-06-04 DIAGNOSIS — M79.646 THUMB PAIN, UNSPECIFIED LATERALITY: ICD-10-CM

## 2021-06-04 DIAGNOSIS — M65.4 DE QUERVAIN'S DISEASE (TENOSYNOVITIS): ICD-10-CM

## 2021-06-04 PROCEDURE — 97112 NEUROMUSCULAR REEDUCATION: CPT | Mod: GO | Performed by: OCCUPATIONAL THERAPIST

## 2021-06-04 PROCEDURE — 97026 INFRARED THERAPY: CPT | Mod: GO | Performed by: OCCUPATIONAL THERAPIST

## 2021-06-04 PROCEDURE — 97110 THERAPEUTIC EXERCISES: CPT | Mod: GO | Performed by: OCCUPATIONAL THERAPIST

## 2021-06-04 PROCEDURE — 97140 MANUAL THERAPY 1/> REGIONS: CPT | Mod: GO | Performed by: OCCUPATIONAL THERAPIST

## 2021-06-10 ENCOUNTER — THERAPY VISIT (OUTPATIENT)
Dept: OCCUPATIONAL THERAPY | Facility: CLINIC | Age: 31
End: 2021-06-10
Payer: COMMERCIAL

## 2021-06-10 ENCOUNTER — THERAPY VISIT (OUTPATIENT)
Dept: PHYSICAL THERAPY | Facility: CLINIC | Age: 31
End: 2021-06-10
Payer: COMMERCIAL

## 2021-06-10 DIAGNOSIS — M79.646 THUMB PAIN, UNSPECIFIED LATERALITY: ICD-10-CM

## 2021-06-10 DIAGNOSIS — M25.511 CHRONIC RIGHT SHOULDER PAIN: ICD-10-CM

## 2021-06-10 DIAGNOSIS — M65.4 DE QUERVAIN'S DISEASE (TENOSYNOVITIS): ICD-10-CM

## 2021-06-10 DIAGNOSIS — G89.29 CHRONIC RIGHT SHOULDER PAIN: ICD-10-CM

## 2021-06-10 PROCEDURE — 97140 MANUAL THERAPY 1/> REGIONS: CPT | Mod: GP | Performed by: PHYSICAL THERAPIST

## 2021-06-10 PROCEDURE — 97112 NEUROMUSCULAR REEDUCATION: CPT | Mod: GO | Performed by: OCCUPATIONAL THERAPIST

## 2021-06-10 PROCEDURE — 97026 INFRARED THERAPY: CPT | Mod: GO | Performed by: OCCUPATIONAL THERAPIST

## 2021-06-10 PROCEDURE — 97110 THERAPEUTIC EXERCISES: CPT | Mod: GP | Performed by: PHYSICAL THERAPIST

## 2021-06-10 PROCEDURE — 97110 THERAPEUTIC EXERCISES: CPT | Mod: GO | Performed by: OCCUPATIONAL THERAPIST

## 2021-06-10 PROCEDURE — 97530 THERAPEUTIC ACTIVITIES: CPT | Mod: GP | Performed by: PHYSICAL THERAPIST

## 2021-06-10 PROCEDURE — 97140 MANUAL THERAPY 1/> REGIONS: CPT | Mod: GO | Performed by: OCCUPATIONAL THERAPIST

## 2021-06-10 NOTE — PROGRESS NOTES
Subjective:  HPI  Physical Exam                    Objective:  System    Physical Exam    General     ROS    Assessment/Plan:    SUBJECTIVE  Subjective changes as noted by pt:   Pt. helped a frien move last weekend and had slight increased R scapular area pain but not bad and it recovered quickly. pt. cont to feel she is progressing overall.    Current pain level: 3/10   Changes in function:  Yes (See Goal flowsheet attached for changes in current functional level)     Adverse reaction to treatment or activity:  None    OBJECTIVE  Changes in objective findings:  Strength R sh ER 4+/5; hor abd 4/5; ext 4/5     ASSESSMENT  Kimmy continues to require intervention to meet STG and LTG's: PT  Patient's symptoms are resolving.  Response to therapy has shown an improvement in  pain level, ROM  and strength  Progress made towards STG/LTG?  Yes (See Goal flowsheet attached for updates on achievement of STG and LTG)    PLAN  Current treatment program is being advanced to more complex exercises.    PTA/ATC plan:  N/A    Please refer to the daily flowsheet for treatment today, total treatment time and time spent performing 1:1 timed codes.

## 2021-06-16 NOTE — PROGRESS NOTES
Hand Therapy Progress Note    Reporting Period: 4/15/2021 through 6/17/2021  Referring Physician: Jeniffer Small MD    Diagnosis: Bilateral elbow tendonitis (right greater than left)  DOI: 10/13/20 (Date of order)    Initial History:  Patient reports symptoms of pain and weakness/loss of strength of the bilateral elbows which occurred due to overuse and repetitive motions. Since onset symptoms are Gradually getting better.  Special tests:  none.  Previous treatment: none.    General health as reported by patient is good.  Pertinent medical history includes:None  Medical allergies:none.  Surgical history: none.  Medication history: None.    Occupational Profile Information:  Current occupation is Digital   Currently working in normal job without restrictions  Job Tasks: Computer Work, Lifting, Carrying, Prolonged Sitting  Prior functional level:  no limitations  Barriers include:none  Mobility: No difficulty  Transportation: drives  Leisure activities/hobbies: floral arranging, video games    Subjective:  Subjective changes as noted by patient: I am having a lot of pain here (dorsal axilla). Its sharp and aching and I think it is impacting the pain down the arm.   Functional changes noted by patient: Improvement in Work Tasks  Response to previous treatment: good  Patient has noted adverse reaction to: None    Objective:    Pain Level (Scale 0-10):   10/28/2020 11/11/20 12/2/20 4/15/21 4/23/21 4/30/21 5/7/21   At Rest 3/10 3/10 1/10 0/10 4/10 3/10 2/10   With Use 8/10 4-5/10 2/10 2-3/10 5-6/10 4/10 4-5/10     Pain Level (Scale 0-10):   5/14/21 5/21/21 5/28/21 6/4/21 6/17/21     At Rest 5 1 0 2 R Dorsal axilla 4  R Arm 2  L Wrist 0     With Use 7 2 1 3 R dorsal axilla 6  R Arm 4  L Wrist 3       Pain Description:  Date 10/28/2020 4/15/21 6/17/21   Location Medial and lateral elbow R thumb, radiates up to lateral elbow R dorsal axilla, radial forearm and thumb, volar wrist to medial elbow  L  radial wrist   Pain Quality Aching, Dull and Sharp Fatigue, sharp, aching Right - feels like nerve pain  Left - sore   Frequency intermittent   intermittent Constant, intermittent   Pain is worst  daytime daytime Daytime, as the day goes on. Feels better in morning upon waking   Exacerbated by  holding, gripping, elbows flexed, use of right thumb Mousing, chopping with knife, washing dishes, pinching/gripping/holding Washing dishes, picking up anything heavier than 5lbs, sitting/slouching   Relieved by rest and wrist braces, extending elbows Self massage with massage gun, splints at night, rest Walking, massage gun, splints   Progression Gradually improving. Improving in elbows, ongoing in thumb Worsening in dorsal axilla and right arm, improving in left     Posture  Rounded Forward Shoulders    Sensation  WNL throughout all nerve distributions; per patient report     ROM  Pain Report: - none  + mild    ++ moderate    +++ severe   Elbow 10/28/2020 10/28/2020 4/15/21 4/15/21   AROM (PROM) Left Right Left Right   Extension 0 0 0 0   Flexion 140 140 140 140+ in thumb   Supination       Pronation         Wrist 10/28/2020 10/28/2020 4/15/21 4/15/21   AROM (PROM) Left Right Left Right   Extension 75 70+ 81 75   Flexion 85 85 85 85   RD 25 15 20 15   UD 40 50 40 35   UD with thumb flexed 30+ 40+ 15+ tight 15+ tight and pain     ROM  Thumb 4/15/2021 4/15/2021 6/17/21 6/17/21   AROM  (PROM) Left Right Left Right   MP -20/71 -25/55 -30/65 -25/64   IP +/65 +/67 +/60 +/79   RABD 40 40+ EPL 42 40   PABD 50 35+ 40 35+     Resisted Testing  Pain Report: - none  + mild    ++ moderate    +++ severe    10/28/2020 10/28/20 4/15/21 4/15/21    Left Right Left Right   APL + ++ - - weak   EPB + ++ + ++ weak   EPL + ++ + + weak   FCR + + - -   Radial Deviation + + NT NT   Ulnar Deviation - - NT NT            Strength   (Measured in pounds)  Pain Report: - none  + mild    ++ moderate    +++ severe    10/28/2020 10/28/2020 4/15/21  4/15/21 6/17/21 6/17/21   Trials Left Right Left Right Left Right   1  2  3 38  47  52 43  37  46 20 to pain 18 to pain 58 41   Average 46 42 20 18 58 41       10/28/2020 10/28/2020 4/15/21 4/15/21 6/17/21 6/17/21    Left Right Left Right Left Right   Elbow Ext 55+ 42 30- 34- 55 38     Lat Pinch 4/15/2021 4/15/2021 6/17/21 6/17/21   Trials Left Right Left Right   1  2  3 10- 6- 13 8   Average 10 6 13 8     3 Pt Pinch 4/15/2021 4/15/2021 6/17/21 6/17/21   Trials Left Right Left Right   1  2  3 7- 4- 8 7   Average 7 4 8 7       Palpation  Pain Report:  - none    + mild    ++ moderate    +++ severe    10/28/2020 10/28/20 4/15/21 4/15/21 6/17/21 6/17/21    Left Right Left Right Left Right   Pec Major - - - - - -   Pec Minor - - - - - -   Proximal Triceps - - - - - -   Teres Minor     - -   Spiral Groove + + - - - -   Distal Triceps + + - - - -   Anconeus - ++ + - - -   ECRB at LEP + ++ + + - -   ECU at LEP + ++ + + - -   EDC at LEP + ++ + + - -   Radial Head ++ + - - - -   Extensor Wad + - + + - -   PIN Site + ++ + + - -     Palpation:  Pain Report:  - none    + mild    ++ moderate    +++ severe   Date 10/28/2020 10/28/20 4/15/21 4/15/21 6/17/21 6/17/21    Left Right Left Right Left Right   Bicep Muscle + ++ + tight - - +   Distal Bicep Tendon - + - - + -   [place] + + - - - -   Cubital Tunnel  - - + - - -   MEP + + + + - -   Flexor Origin + + + + - -   Flexor Wad - - - - - -   Pronator Teres - - - - - -   [place] - - - - - -              Palpation:  Pain Report:  - none    + mild    ++ moderate    +++ severe    10/28/2020 10/28/20 4/15/21 4/15/21 6/17/21 6/17/21    Left Right Left Right Left Right   Radial Styloid + ++ - ++ - -   1st DC + ++ - + - -   FCR + ++ - - - -   Thumb CMC - - - - - -     Special Tests   - none    + mild    ++ moderate    +++ severe      Right 10/28/2020   Elbow Flexion Test -   Tinels Cubital Tunnel -   Tinels [place] Canal -   ULTT Ulnar Nerve      Special Tests   Pain  Report:  - none    + mild    ++ moderate    +++ severe    10/28/2020 10/28/20 4/15/21 4/15/21 6/17/21 6/17/21    Left Right Left Right Left Right   Finkelsteins + ++ + tight + tight and pain Tight, no pain Tight, no pain   Radial Nerve Tinel's (DRSN) - - - + pain - + pain   Th CMC Grind - - - - NT NT   Th CMC Passive Retropulsion - - - - NT NT   WHAT test + +++   NT NT     Assessment:  Response to therapy has been improvement to:  Strength:   and pinch  Pain on Left:  frequency is less, intensity of pain is decreased, duration of pain is decreased and less tender over affected area  Response to therapy has been lack of progress in:  Pain on Right:  Increased pain through middle trap and dorsal forearm.  Overall Assessment:  Patient would benefit from continued therapy to achieve rehab potential  STG/LTG:  See goal sheet for details and updates of remaining functional limitations.     Plan:  I have re-evaluated this patient and find that the nature, scope, duration and intensity of the therapy is appropriate for the medical condition of the patient.  Frequency:  1 X week, once daily  Duration:  for 8 additional weeks     Home Program:   Epsom Salt Soaks  STM to adductor with clip  STM to FA flexors, extensors and palm with spiky ball  FA extensor stretch  Thumb RABD stretch - 4/23/21 discontinue due to pain  Dequervain's Stretch  K-tape trial (Dequervain's method)  Proximal RN glides  Foam rolling to lats and pecs  Foam roller stretch - pec minor  Workstation recommendations: vertical mouse, kinesis keyboard, new adjustable desk  STM to middle trap with ball while arm is adducted  Strengthening 3 x week:  Wrist isotonics with 2lbs (flexion/extension)   strengthening with ball    Next Visit:  Check trap pain  Laser and STM to trap as needed  STM - flexors, extensors, adductor, thenars  Stretches    Please refer to the daily flowsheet for treatment provided today.

## 2021-06-17 ENCOUNTER — THERAPY VISIT (OUTPATIENT)
Dept: OCCUPATIONAL THERAPY | Facility: CLINIC | Age: 31
End: 2021-06-17
Payer: COMMERCIAL

## 2021-06-17 ENCOUNTER — THERAPY VISIT (OUTPATIENT)
Dept: PHYSICAL THERAPY | Facility: CLINIC | Age: 31
End: 2021-06-17
Payer: COMMERCIAL

## 2021-06-17 DIAGNOSIS — G89.29 CHRONIC RIGHT SHOULDER PAIN: ICD-10-CM

## 2021-06-17 DIAGNOSIS — M25.511 CHRONIC RIGHT SHOULDER PAIN: ICD-10-CM

## 2021-06-17 DIAGNOSIS — M79.646 THUMB PAIN, UNSPECIFIED LATERALITY: ICD-10-CM

## 2021-06-17 DIAGNOSIS — M65.4 DE QUERVAIN'S DISEASE (TENOSYNOVITIS): ICD-10-CM

## 2021-06-17 PROCEDURE — 97140 MANUAL THERAPY 1/> REGIONS: CPT | Mod: GP | Performed by: PHYSICAL THERAPIST

## 2021-06-17 PROCEDURE — 97530 THERAPEUTIC ACTIVITIES: CPT | Mod: GP | Performed by: PHYSICAL THERAPIST

## 2021-06-17 PROCEDURE — 97140 MANUAL THERAPY 1/> REGIONS: CPT | Mod: GO | Performed by: OCCUPATIONAL THERAPIST

## 2021-06-17 PROCEDURE — 97026 INFRARED THERAPY: CPT | Mod: GO | Performed by: OCCUPATIONAL THERAPIST

## 2021-06-17 PROCEDURE — 97112 NEUROMUSCULAR REEDUCATION: CPT | Mod: GO | Performed by: OCCUPATIONAL THERAPIST

## 2021-06-17 PROCEDURE — 97110 THERAPEUTIC EXERCISES: CPT | Mod: GO | Performed by: OCCUPATIONAL THERAPIST

## 2021-06-17 PROCEDURE — 97110 THERAPEUTIC EXERCISES: CPT | Mod: GP | Performed by: PHYSICAL THERAPIST

## 2021-06-28 ENCOUNTER — THERAPY VISIT (OUTPATIENT)
Dept: OCCUPATIONAL THERAPY | Facility: CLINIC | Age: 31
End: 2021-06-28
Payer: COMMERCIAL

## 2021-06-28 DIAGNOSIS — M65.4 DE QUERVAIN'S DISEASE (TENOSYNOVITIS): ICD-10-CM

## 2021-06-28 DIAGNOSIS — M79.646 THUMB PAIN, UNSPECIFIED LATERALITY: ICD-10-CM

## 2021-06-28 PROCEDURE — 97140 MANUAL THERAPY 1/> REGIONS: CPT | Mod: GO | Performed by: OCCUPATIONAL THERAPIST

## 2021-06-28 PROCEDURE — 97026 INFRARED THERAPY: CPT | Mod: GO | Performed by: OCCUPATIONAL THERAPIST

## 2021-06-28 PROCEDURE — 97112 NEUROMUSCULAR REEDUCATION: CPT | Mod: GO | Performed by: OCCUPATIONAL THERAPIST

## 2021-06-28 PROCEDURE — 97110 THERAPEUTIC EXERCISES: CPT | Mod: GO | Performed by: OCCUPATIONAL THERAPIST

## 2021-07-01 ENCOUNTER — THERAPY VISIT (OUTPATIENT)
Dept: PHYSICAL THERAPY | Facility: CLINIC | Age: 31
End: 2021-07-01
Payer: COMMERCIAL

## 2021-07-01 DIAGNOSIS — M25.511 CHRONIC RIGHT SHOULDER PAIN: ICD-10-CM

## 2021-07-01 DIAGNOSIS — G89.29 CHRONIC RIGHT SHOULDER PAIN: ICD-10-CM

## 2021-07-01 PROCEDURE — 97530 THERAPEUTIC ACTIVITIES: CPT | Mod: GP | Performed by: PHYSICAL THERAPIST

## 2021-07-01 PROCEDURE — 97140 MANUAL THERAPY 1/> REGIONS: CPT | Mod: GP | Performed by: PHYSICAL THERAPIST

## 2021-07-01 PROCEDURE — 97110 THERAPEUTIC EXERCISES: CPT | Mod: GP | Performed by: PHYSICAL THERAPIST

## 2021-07-01 NOTE — PROGRESS NOTES
Subjective:  HPI  Physical Exam                    Objective:  System    Physical Exam    General     ROS    Assessment/Plan:    PROGRESS  REPORT    Progress reporting period is from 4-19-21 to 7-1-21.       SUBJECTIVE  Subjective changes noted by patient:   Pt. is making slow but steady progress in PT for R scapular/shoulder pain. 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 She has gotten a standing desk at home which really helps. She also has gained steady strength in her R shoulder which gives her increased ability to perform physical activities. That said, she still runs into pain problems with heavier lifting or prolonged physical activities such as biking.        Current pain level is 3/10.     Previous pain level was  5/10.   Changes in function:  Yes (See Goal flowsheet attached for changes in current functional level)  Adverse reaction to treatment or activity: None    OBJECTIVE  Changes noted in objective findings:  Strength R sh flex 4+/5; abd 4/5; ER 4/5; hor abd 4/5     ASSESSMENT/PLAN  Updated problem list and treatment plan: Diagnosis 1:  R shoulder/upper back pain  Pain -  manual therapy, self management and education  Decreased strength - therapeutic exercise and therapeutic activities  Impaired muscle performance - neuro re-education  Decreased function - therapeutic activities  Impaired posture - neuro re-education  STG/LTGs have been met or progress has been made towards goals:  Yes (See Goal flow sheet completed today.)  Assessment of Progress: The patient's condition is improving.  Self Management Plans:  Patient has been instructed in a home treatment program.  Patient  has been instructed in self management of symptoms.  I have re-evaluated this patient and find that the nature, scope, duration and intensity of the therapy is appropriate for the medical condition of the patient.  Kimmy continues to require the following intervention to meet  STG and LTG's:  PT    Recommendations:  This patient would benefit from continued therapy.     Frequency:  1 X week, once daily  Duration:  for 6 weeks        Please refer to the daily flowsheet for treatment today, total treatment time and time spent performing 1:1 timed codes.

## 2021-07-06 NOTE — PROGRESS NOTES
SOAP Note Objective Information for 7/9/2021:    Pain Level (Scale 0-10):   10/28/2020 11/11/20 12/2/20 4/15/21 4/23/21 4/30/21 5/7/21   At Rest 3/10 3/10 1/10 0/10 4/10 3/10 2/10   With Use 8/10 4-5/10 2/10 2-3/10 5-6/10 4/10 4-5/10     Pain Level (Scale 0-10):   5/14/21 5/21/21 5/28/21 6/4/21 6/17/21 7/9/21   At Rest 5 1 0 2 R Dorsal axilla 4  R Arm 2  L Wrist 0 R Dorsal axilla 0  R Arm 0  L Wrist 0   With Use 7 2 1 3 R dorsal axilla 6  R Arm 4  L Wrist 3 R Dorsal axilla 3  R Arm 2  L Wrist 1     Pain Description:  Date 10/28/2020 4/15/21 6/17/21   Location Medial and lateral elbow R thumb, radiates up to lateral elbow R dorsal axilla, radial forearm and thumb, volar wrist to medial elbow  L radial wrist   Pain Quality Aching, Dull and Sharp Fatigue, sharp, aching Right - feels like nerve pain  Left - sore   Frequency intermittent   intermittent Constant, intermittent   Pain is worst  daytime daytime Daytime, as the day goes on. Feels better in morning upon waking   Exacerbated by  holding, gripping, elbows flexed, use of right thumb Mousing, chopping with knife, washing dishes, pinching/gripping/holding Washing dishes, picking up anything heavier than 5lbs, sitting/slouching   Relieved by rest and wrist braces, extending elbows Self massage with massage gun, splints at night, rest Walking, massage gun, splints   Progression Gradually improving. Improving in elbows, ongoing in thumb Worsening in dorsal axilla and right arm, improving in left     Home Program:   Epsom Salt Soaks  STM to adductor with clip  STM to FA flexors, extensors and palm with spiky ball  FA extensor stretch  Thumb RABD stretch - 4/23/21 discontinue due to pain  Dequervain's Stretch  K-tape trial (Dequervain's method)  Proximal RN glides  Foam rolling to lats and pecs  Foam roller stretch - pec minor  Workstation recommendations: vertical mouse, kinesis keyboard, new adjustable desk  STM to middle trap with ball while arm is  adducted  Strengthening 3 x week:  Wrist isotonics with 2lbs (flexion/extension)   strengthening with ball    Next Visit:  Check trap pain  Laser and STM to trap as needed  STM - flexors, extensors, adductor, thenars  Stretches    Please refer to the daily flowsheet for treatment provided today.

## 2021-07-09 ENCOUNTER — THERAPY VISIT (OUTPATIENT)
Dept: OCCUPATIONAL THERAPY | Facility: CLINIC | Age: 31
End: 2021-07-09
Payer: COMMERCIAL

## 2021-07-09 ENCOUNTER — THERAPY VISIT (OUTPATIENT)
Dept: PHYSICAL THERAPY | Facility: CLINIC | Age: 31
End: 2021-07-09
Payer: COMMERCIAL

## 2021-07-09 DIAGNOSIS — M65.4 DE QUERVAIN'S DISEASE (TENOSYNOVITIS): ICD-10-CM

## 2021-07-09 DIAGNOSIS — G89.29 CHRONIC RIGHT SHOULDER PAIN: ICD-10-CM

## 2021-07-09 DIAGNOSIS — M79.646 THUMB PAIN, UNSPECIFIED LATERALITY: ICD-10-CM

## 2021-07-09 DIAGNOSIS — M25.511 CHRONIC RIGHT SHOULDER PAIN: ICD-10-CM

## 2021-07-09 PROCEDURE — 97110 THERAPEUTIC EXERCISES: CPT | Mod: GP | Performed by: PHYSICAL THERAPIST

## 2021-07-09 PROCEDURE — 97112 NEUROMUSCULAR REEDUCATION: CPT | Mod: GO | Performed by: OCCUPATIONAL THERAPIST

## 2021-07-09 PROCEDURE — 97110 THERAPEUTIC EXERCISES: CPT | Mod: GO | Performed by: OCCUPATIONAL THERAPIST

## 2021-07-09 PROCEDURE — 97026 INFRARED THERAPY: CPT | Mod: GO | Performed by: OCCUPATIONAL THERAPIST

## 2021-07-09 PROCEDURE — 97140 MANUAL THERAPY 1/> REGIONS: CPT | Mod: GO | Performed by: OCCUPATIONAL THERAPIST

## 2021-07-09 PROCEDURE — 97530 THERAPEUTIC ACTIVITIES: CPT | Mod: GP | Performed by: PHYSICAL THERAPIST

## 2021-07-09 PROCEDURE — 97140 MANUAL THERAPY 1/> REGIONS: CPT | Mod: GP | Performed by: PHYSICAL THERAPIST

## 2021-07-16 ENCOUNTER — THERAPY VISIT (OUTPATIENT)
Dept: OCCUPATIONAL THERAPY | Facility: CLINIC | Age: 31
End: 2021-07-16
Payer: COMMERCIAL

## 2021-07-16 DIAGNOSIS — M65.4 DE QUERVAIN'S DISEASE (TENOSYNOVITIS): ICD-10-CM

## 2021-07-16 DIAGNOSIS — M79.646 THUMB PAIN, UNSPECIFIED LATERALITY: ICD-10-CM

## 2021-07-16 PROCEDURE — 97110 THERAPEUTIC EXERCISES: CPT | Mod: GO | Performed by: OCCUPATIONAL THERAPIST

## 2021-07-16 PROCEDURE — 97112 NEUROMUSCULAR REEDUCATION: CPT | Mod: GO | Performed by: OCCUPATIONAL THERAPIST

## 2021-07-16 PROCEDURE — 97026 INFRARED THERAPY: CPT | Mod: GO | Performed by: OCCUPATIONAL THERAPIST

## 2021-07-16 PROCEDURE — 97140 MANUAL THERAPY 1/> REGIONS: CPT | Mod: GO | Performed by: OCCUPATIONAL THERAPIST

## 2021-07-23 ENCOUNTER — THERAPY VISIT (OUTPATIENT)
Dept: PHYSICAL THERAPY | Facility: CLINIC | Age: 31
End: 2021-07-23
Payer: COMMERCIAL

## 2021-07-23 DIAGNOSIS — M25.511 CHRONIC RIGHT SHOULDER PAIN: ICD-10-CM

## 2021-07-23 DIAGNOSIS — G89.29 CHRONIC RIGHT SHOULDER PAIN: ICD-10-CM

## 2021-07-23 PROCEDURE — 97140 MANUAL THERAPY 1/> REGIONS: CPT | Mod: GP | Performed by: PHYSICAL THERAPIST

## 2021-07-23 PROCEDURE — 97110 THERAPEUTIC EXERCISES: CPT | Mod: GP | Performed by: PHYSICAL THERAPIST

## 2021-07-23 PROCEDURE — 97530 THERAPEUTIC ACTIVITIES: CPT | Mod: GP | Performed by: PHYSICAL THERAPIST

## 2021-07-26 NOTE — PROGRESS NOTES
Subjective:  HPI  Physical Exam                    Objective:  System    Physical Exam    General     ROS    Assessment/Plan:    SUBJECTIVE  Subjective changes as noted by pt:   Pt. had 2 good weeks since the last visit with minimal c/o R upper back/shoulder pain. Pt. is able to do all her normal daily activities without much problem now.   Current pain level:  2/10   Changes in function:  Yes (See Goal flowsheet attached for changes in current functional level)     Adverse reaction to treatment or activity:  None    OBJECTIVE  Changes in objective findings:  Strength R sh flex 4+/5; abd 4+/5; ER 4+/5; hor abd 4/5      ASSESSMENT  Kimmy continues to require intervention to meet STG and LTG's: PT  Patient's symptoms are resolving.  Response to therapy has shown an improvement in  pain level, ROM  and strength  Progress made towards STG/LTG?  Yes (See Goal flowsheet attached for updates on achievement of STG and LTG)    PLAN  Current treatment program is being advanced to more complex exercises.    PTA/ATC plan:  N/A    Please refer to the daily flowsheet for treatment today, total treatment time and time spent performing 1:1 timed codes.

## 2021-07-30 ENCOUNTER — THERAPY VISIT (OUTPATIENT)
Dept: OCCUPATIONAL THERAPY | Facility: CLINIC | Age: 31
End: 2021-07-30
Payer: COMMERCIAL

## 2021-07-30 DIAGNOSIS — M65.4 DE QUERVAIN'S DISEASE (TENOSYNOVITIS): ICD-10-CM

## 2021-07-30 DIAGNOSIS — M79.646 THUMB PAIN, UNSPECIFIED LATERALITY: ICD-10-CM

## 2021-07-30 PROCEDURE — 97026 INFRARED THERAPY: CPT | Mod: GO | Performed by: OCCUPATIONAL THERAPIST

## 2021-07-30 PROCEDURE — 97110 THERAPEUTIC EXERCISES: CPT | Mod: GO | Performed by: OCCUPATIONAL THERAPIST

## 2021-07-30 PROCEDURE — 97140 MANUAL THERAPY 1/> REGIONS: CPT | Mod: GO | Performed by: OCCUPATIONAL THERAPIST

## 2021-07-30 PROCEDURE — 97112 NEUROMUSCULAR REEDUCATION: CPT | Mod: GO | Performed by: OCCUPATIONAL THERAPIST

## 2021-08-06 ENCOUNTER — THERAPY VISIT (OUTPATIENT)
Dept: OCCUPATIONAL THERAPY | Facility: CLINIC | Age: 31
End: 2021-08-06
Payer: COMMERCIAL

## 2021-08-06 ENCOUNTER — THERAPY VISIT (OUTPATIENT)
Dept: PHYSICAL THERAPY | Facility: CLINIC | Age: 31
End: 2021-08-06
Payer: COMMERCIAL

## 2021-08-06 DIAGNOSIS — M79.646 THUMB PAIN, UNSPECIFIED LATERALITY: ICD-10-CM

## 2021-08-06 DIAGNOSIS — M25.511 CHRONIC RIGHT SHOULDER PAIN: ICD-10-CM

## 2021-08-06 DIAGNOSIS — G89.29 CHRONIC RIGHT SHOULDER PAIN: ICD-10-CM

## 2021-08-06 DIAGNOSIS — M65.4 DE QUERVAIN'S DISEASE (TENOSYNOVITIS): ICD-10-CM

## 2021-08-06 PROCEDURE — 97140 MANUAL THERAPY 1/> REGIONS: CPT | Mod: GO | Performed by: OCCUPATIONAL THERAPIST

## 2021-08-06 PROCEDURE — 97110 THERAPEUTIC EXERCISES: CPT | Mod: GP | Performed by: PHYSICAL THERAPIST

## 2021-08-06 PROCEDURE — 97530 THERAPEUTIC ACTIVITIES: CPT | Mod: GP | Performed by: PHYSICAL THERAPIST

## 2021-08-06 PROCEDURE — 97110 THERAPEUTIC EXERCISES: CPT | Mod: GO | Performed by: OCCUPATIONAL THERAPIST

## 2021-08-06 PROCEDURE — 97140 MANUAL THERAPY 1/> REGIONS: CPT | Mod: GP | Performed by: PHYSICAL THERAPIST

## 2021-08-06 PROCEDURE — 97112 NEUROMUSCULAR REEDUCATION: CPT | Mod: GO | Performed by: OCCUPATIONAL THERAPIST

## 2021-08-06 PROCEDURE — 97026 INFRARED THERAPY: CPT | Mod: GO | Performed by: OCCUPATIONAL THERAPIST

## 2021-08-06 NOTE — PROGRESS NOTES
Subjective:  HPI  Physical Exam                    Objective:  System    Physical Exam    General     ROS    Assessment/Plan:    SUBJECTIVE  Subjective changes as noted by pt:   Pt. had a slight flareup earlier this week at work with prolonged sitting at the computer which she feels was related to her posture. Once she checked her posture and stood more the pain reduced after 1 day.   Current pain level:  2/10   Changes in function:  Yes (See Goal flowsheet attached for changes in current functional level)     Adverse reaction to treatment or activity:  None    OBJECTIVE  Changes in objective findings:  Strength R sh flex 4+/5; abd 4+/5; ER 4+/5; hor abd 4/5; ext 4+/5     ASSESSMENT  Kimmy continues to require intervention to meet STG and LTG's: PT  Patient's symptoms are resolving.  Response to therapy has shown an improvement in  pain level, ROM  and strength  Progress made towards STG/LTG?  Yes (See Goal flowsheet attached for updates on achievement of STG and LTG)    PLAN  Current treatment program is being advanced to more complex exercises.    PTA/ATC plan:  N/A    Please refer to the daily flowsheet for treatment today, total treatment time and time spent performing 1:1 timed codes.

## 2021-08-16 NOTE — PROGRESS NOTES
Hand Therapy Progress Note  Reporting Period: 6/17/2021 through 8/19/2021  Referring Physician: Jeniffer Small MD    Diagnosis: Bilateral elbow tendonitis (right greater than left)  DOI: 10/13/20 (Date of order)    Initial History:  Patient reports symptoms of pain and weakness/loss of strength of the bilateral elbows which occurred due to overuse and repetitive motions. Since onset symptoms are Gradually getting better.  Special tests:  none.  Previous treatment: none.    General health as reported by patient is good.  Pertinent medical history includes:None  Medical allergies:none.  Surgical history: none.  Medication history: None.    Occupational Profile Information:  Current occupation is Digital   Currently working in normal job without restrictions  Job Tasks: Computer Work, Lifting, Carrying, Prolonged Sitting  Prior functional level:  no limitations  Barriers include:none  Mobility: No difficulty  Transportation: drives  Leisure activities/hobbies: floral arranging, video games    Subjective:  Subjective changes as noted by patient: The arms are overall feeling good! They didn't bother me much on the honeymoon.   Functional changes noted by patient: Improvement in Self Care Tasks (dressing, eating, bathing, hygiene/toileting) and Household Chores - however still painful with doing dishes, watering garden and folding laundry  Response to previous treatment: good  Patient has noted adverse reaction to: None    Objective:    Pain Level (Scale 0-10):   10/28/2020 11/11/20 12/2/20 4/15/21 4/23/21 4/30/21 5/7/21   At Rest 3/10 3/10 1/10 0/10 4/10 3/10 2/10   With Use 8/10 4-5/10 2/10 2-3/10 5-6/10 4/10 4-5/10     Pain Level (Scale 0-10):   5/14/21 5/21/21 5/28/21 6/4/21 6/17/21 8/19/21    At Rest 5 1 0 2 R Dorsal axilla 4  R Arm 2  L Wrist 0 R: 0  L:0    With Use 7 2 1 3 R dorsal axilla 6  R Arm 4  L Wrist 3 R: 2  L: 1      Pain Description:  Date 10/28/2020 4/15/21 6/17/21 8/19/21    Location Medial and lateral elbow R thumb, radiates up to lateral elbow R dorsal axilla, radial forearm and thumb, volar wrist to medial elbow  L radial wrist R:volar wrist, adductor, medial elbow  L: dorsal thumb, radial wrist   Pain Quality Aching, Dull and Sharp Fatigue, sharp, aching Right - feels like nerve pain  Left - sore Right: sharp  Left: Fatigue   Frequency intermittent   intermittent Constant, intermittent intermittent   Pain is worst  daytime daytime Daytime, as the day goes on. Feels better in morning upon waking daytime   Exacerbated by  holding, gripping, elbows flexed, use of right thumb Mousing, chopping with knife, washing dishes, pinching/gripping/holding Washing dishes, picking up anything heavier than 5lbs, sitting/slouching Watering garden/holding hose, washing dishes, folding laundry   Relieved by rest and wrist braces, extending elbows Self massage with massage gun, splints at night, rest Walking, massage gun, splints Clip in adductor, massage   Progression Gradually improving. Improving in elbows, ongoing in thumb Worsening in dorsal axilla and right arm, improving in left improving     Posture  Rounded Forward Shoulders    Sensation  WNL throughout all nerve distributions; per patient report     ROM  Pain Report: - none  + mild    ++ moderate    +++ severe   Elbow 10/28/2020 10/28/2020 4/15/21 4/15/21   AROM (PROM) Left Right Left Right   Extension 0 0 0 0   Flexion 140 140 140 140+ in thumb   Supination       Pronation         Wrist 10/28/2020 10/28/2020 4/15/21 4/15/21   AROM (PROM) Left Right Left Right   Extension 75 70+ 81 75   Flexion 85 85 85 85   RD 25 15 20 15   UD 40 50 40 35   UD with thumb flexed 30+ 40+ 15+ tight 15+ tight and pain     ROM  Thumb 4/15/2021 4/15/2021 6/17/21 6/17/21   AROM  (PROM) Left Right Left Right   MP -20/71 -25/55 -30/65 -25/64   IP +/65 +/67 +/60 +/79   RABD 40 40+ EPL 42 40   PABD 50 35+ 40 35+     Resisted Testing  Pain Report: - none  + mild    ++  moderate    +++ severe    10/28/2020 10/28/20 4/15/21 4/15/21 8/19/21 8/19/21    Left Right Left Right Left Right   APL + ++ - - weak - -   EPB + ++ + ++ weak - +   EPL + ++ + + weak - -   FCR + + - - - -   Radial Deviation + + NT NT NT NT   Ulnar Deviation - - NT NT NT NT              Strength   (Measured in pounds)  Pain Report: - none  + mild    ++ moderate    +++ severe    10/28/20 10/28/20 4/15/21 4/15/21 6/17/21 6/17/21 8/19/21 8/19/21   Trials Left Right Left Right Left Right Left Right   1  2  3 38  47  52 43  37  46 20 to pain 18 to pain 58 41 58 42-   Average 46 42 20 18 58 41 58 42       10/28/20 10/28/20 4/15/21 4/15/21 6/17/21 6/17/21    Left Right Left Right Left Right   Elbow Ext 55+ 42 30- 34- 55 38     Lat Pinch 4/15/2021 4/15/2021 6/17/21 6/17/21 8/19/21 8/19/21   Trials Left Right Left Right Left Right   1  2  3 10- 6- 13 8 16- 9-   Average 10 6 13 8 16 9     3 Pt Pinch 4/15/2021 4/15/2021 6/17/21 6/17/21 8/19/21 8/19/21   Trials Left Right Left Right Left Right   1  2  3 7- 4- 8 7 8- 7-   Average 7 4 8 7 8 7     Palpation  Pain Report:  - none    + mild    ++ moderate    +++ severe    10/28/2020 10/28/20 4/15/21 4/15/21 6/17/21 6/17/21    Left Right Left Right Left Right   Pec Major - - - - - -   Pec Minor - - - - - -   Proximal Triceps - - - - - -   Teres Minor     - -   Spiral Groove + + - - - -   Distal Triceps + + - - - -   Anconeus - ++ + - - -   ECRB at LEP + ++ + + - -   ECU at LEP + ++ + + - -   EDC at LEP + ++ + + - -   Radial Head ++ + - - - -   Extensor Wad + - + + - -   PIN Site + ++ + + - -     Palpation  Pain Report:  - none    + mild    ++ moderate    +++ severe    8/19/21 8/19/21        Left Right       Pec Major NT NT       Pec Minor NT NT       Proximal Triceps NT NT       Teres Minor NT NT       Spiral Groove NT NT       Distal Triceps NT NT       Anconeus NT NT       ECRB at LEP - -       ECU at LEP - -       EDC at LEP - -       Radial Head - -       Extensor Wad - -        PIN Site - -         Palpation:  Pain Report:  - none    + mild    ++ moderate    +++ severe   Date 10/28/2020 10/28/20 4/15/21 4/15/21 6/17/21 6/17/21    Left Right Left Right Left Right   Bicep Muscle + ++ + tight - - +   Distal Bicep Tendon - + - - + -   [place] + + - - - -   Cubital Tunnel  - - + - - -   MEP + + + + - -   Flexor Origin + + + + - -   Flexor Wad - - - - - -   Pronator Teres - - - - - -   [place] - - - - - -              Palpation:  Pain Report:  - none    + mild    ++ moderate    +++ severe   Date 8/19/21 8/19/21        Left Right       Bicep Muscle + -       Distal Bicep Tendon + -       [place]nyder of [place] - -       Cubital Tunnel  - -       MEP - -       Flexor Origin - -       Flexor Wad - -       Pronator Teres - -       [place] - -                  Palpation:  Pain Report:  - none    + mild    ++ moderate    +++ severe    10/28/2020 10/28/20 4/15/21 4/15/21 6/17/21 6/17/21    Left Right Left Right Left Right   Radial Styloid + ++ - ++ - -   1st DC + ++ - + - -   FCR + ++ - - - -   Thumb CMC - - - - - -     Palpation:  Pain Report:  - none    + mild    ++ moderate    +++ severe    8/19/21 8/19/21        Left Right       Radial Styloid - -       1st DC + +       FCR + +       Thumb CMC - -         Special Tests   Pain Report:  - none    + mild    ++ moderate    +++ severe    10/28/2020 10/28/20 4/15/21 4/15/21 6/17/21 6/17/21    Left Right Left Right Left Right   Finkelsteins + ++ + tight + tight and pain Tight, no pain Tight, no pain   Radial Nerve Tinel's (DRSN) - - - + pain - + pain   Th CMC Grind - - - - NT NT   Th CMC Passive Retropulsion - - - - NT NT   WHAT test + +++   NT NT     Special Tests   Pain Report:  - none    + mild    ++ moderate    +++ severe    8/19/21 8/19/21        Left Right       Finkelsteins Pull/tight Tight stretch down forearm       Radial Nerve Tinel's (DRSN) - -       Th CMC Grind NT NT       Th CMC Passive Retropulsion NT NT       WHAT  test NT NT         Assessment:  Response to therapy has been improvement to:  Strength:   and pinch  Pain:  frequency is less, intensity of pain is decreased, duration of pain is decreased and less tender over affected area  Overall Assessment:  Patient's symptoms are resolving.  Patient would benefit from continued therapy to achieve rehab potential  STG/LTG:  See goal sheet for details and updates of remaining functional limitations.     Plan:  I have re-evaluated this patient and find that the nature, scope, duration and intensity of the therapy is appropriate for the medical condition of the patient.  Frequency:  1 X week, once daily  Duration:  for 6 additional weeks     Home Program:   Epsom Salt Soaks  STM to adductor with clip  STM to FA flexors, extensors and palm with spiky ball  FA extensor stretch  Thumb RABD stretch - 4/23/21 discontinue due to pain  Dequervain's Stretch  K-tape trial (Dequervain's method)  Proximal RN glides  Foam rolling to lats and pecs  Foam roller stretch - pec minor  Workstation recommendations: vertical mouse, kinesis keyboard, new adjustable desk  STM to middle trap with ball while arm is adducted  Adductor stretch on table  Strengthening 3 x week:  Wrist isotonics with 2lbs (flexion/extension)   strengthening with ball    Next Visit:  Add thumb stability strengthening  Laser and STM to trap as needed  STM - flexors, extensors, adductor, thenars  Stretches    Please refer to the daily flowsheet for treatment provided today.

## 2021-08-19 ENCOUNTER — THERAPY VISIT (OUTPATIENT)
Dept: PHYSICAL THERAPY | Facility: CLINIC | Age: 31
End: 2021-08-19
Payer: COMMERCIAL

## 2021-08-19 ENCOUNTER — THERAPY VISIT (OUTPATIENT)
Dept: OCCUPATIONAL THERAPY | Facility: CLINIC | Age: 31
End: 2021-08-19
Payer: COMMERCIAL

## 2021-08-19 DIAGNOSIS — M79.646 THUMB PAIN, UNSPECIFIED LATERALITY: ICD-10-CM

## 2021-08-19 DIAGNOSIS — M65.4 DE QUERVAIN'S DISEASE (TENOSYNOVITIS): ICD-10-CM

## 2021-08-19 DIAGNOSIS — G89.29 CHRONIC RIGHT SHOULDER PAIN: ICD-10-CM

## 2021-08-19 DIAGNOSIS — M25.511 CHRONIC RIGHT SHOULDER PAIN: ICD-10-CM

## 2021-08-19 PROCEDURE — 97140 MANUAL THERAPY 1/> REGIONS: CPT | Mod: GO | Performed by: OCCUPATIONAL THERAPIST

## 2021-08-19 PROCEDURE — 97026 INFRARED THERAPY: CPT | Mod: GO | Performed by: OCCUPATIONAL THERAPIST

## 2021-08-19 PROCEDURE — 97112 NEUROMUSCULAR REEDUCATION: CPT | Mod: GO | Performed by: OCCUPATIONAL THERAPIST

## 2021-08-19 PROCEDURE — 97140 MANUAL THERAPY 1/> REGIONS: CPT | Mod: GP | Performed by: PHYSICAL THERAPIST

## 2021-08-19 PROCEDURE — 97530 THERAPEUTIC ACTIVITIES: CPT | Mod: GP | Performed by: PHYSICAL THERAPIST

## 2021-08-19 PROCEDURE — 97110 THERAPEUTIC EXERCISES: CPT | Mod: GO | Performed by: OCCUPATIONAL THERAPIST

## 2021-08-19 PROCEDURE — 97110 THERAPEUTIC EXERCISES: CPT | Mod: GP | Performed by: PHYSICAL THERAPIST

## 2021-08-19 NOTE — PROGRESS NOTES
Subjective:  HPI  Physical Exam                    Objective:  System    Physical Exam    General     ROS    Assessment/Plan:    SUBJECTIVE  Subjective changes as noted by pt:   Pt. has had some increased R scapular pain this week with prolonged sitting at work. She has no pain with standing.    Current pain level:  3/10   Changes in function:  Yes (See Goal flowsheet attached for changes in current functional level)     Adverse reaction to treatment or activity:  None    OBJECTIVE  Changes in objective findings:  Strength R sh flex 4+/5; abd 4+/5; ER 4+/5; hor abd 4/5; ext 4+/5. Palpation - MF tightness R medial scap musculature     ASSESSMENT  Kimmy continues to require intervention to meet STG and LTG's: PT  Patient's symptoms are resolving.  Response to therapy has shown an improvement in  pain level, ROM  and strength  Progress made towards STG/LTG?  Yes (See Goal flowsheet attached for updates on achievement of STG and LTG)    PLAN  Current treatment program is being advanced to more complex exercises.    PTA/ATC plan:  N/A    Please refer to the daily flowsheet for treatment today, total treatment time and time spent performing 1:1 timed codes.

## 2021-08-27 ENCOUNTER — THERAPY VISIT (OUTPATIENT)
Dept: OCCUPATIONAL THERAPY | Facility: CLINIC | Age: 31
End: 2021-08-27
Payer: COMMERCIAL

## 2021-08-27 ENCOUNTER — THERAPY VISIT (OUTPATIENT)
Dept: PHYSICAL THERAPY | Facility: CLINIC | Age: 31
End: 2021-08-27
Payer: COMMERCIAL

## 2021-08-27 DIAGNOSIS — M65.4 DE QUERVAIN'S DISEASE (TENOSYNOVITIS): ICD-10-CM

## 2021-08-27 DIAGNOSIS — G89.29 CHRONIC RIGHT SHOULDER PAIN: ICD-10-CM

## 2021-08-27 DIAGNOSIS — M25.511 CHRONIC RIGHT SHOULDER PAIN: ICD-10-CM

## 2021-08-27 DIAGNOSIS — M79.646 THUMB PAIN, UNSPECIFIED LATERALITY: ICD-10-CM

## 2021-08-27 PROCEDURE — 97530 THERAPEUTIC ACTIVITIES: CPT | Mod: GP | Performed by: PHYSICAL THERAPIST

## 2021-08-27 PROCEDURE — 97140 MANUAL THERAPY 1/> REGIONS: CPT | Mod: GP | Performed by: PHYSICAL THERAPIST

## 2021-08-27 PROCEDURE — 97140 MANUAL THERAPY 1/> REGIONS: CPT | Mod: GO | Performed by: OCCUPATIONAL THERAPIST

## 2021-08-27 PROCEDURE — 97112 NEUROMUSCULAR REEDUCATION: CPT | Mod: GO | Performed by: OCCUPATIONAL THERAPIST

## 2021-08-27 PROCEDURE — 97110 THERAPEUTIC EXERCISES: CPT | Mod: GP | Performed by: PHYSICAL THERAPIST

## 2021-08-27 PROCEDURE — 97110 THERAPEUTIC EXERCISES: CPT | Mod: GO | Performed by: OCCUPATIONAL THERAPIST

## 2021-08-27 PROCEDURE — 97026 INFRARED THERAPY: CPT | Mod: GO | Performed by: OCCUPATIONAL THERAPIST

## 2021-09-10 ENCOUNTER — THERAPY VISIT (OUTPATIENT)
Dept: PHYSICAL THERAPY | Facility: CLINIC | Age: 31
End: 2021-09-10
Payer: COMMERCIAL

## 2021-09-10 ENCOUNTER — THERAPY VISIT (OUTPATIENT)
Dept: OCCUPATIONAL THERAPY | Facility: CLINIC | Age: 31
End: 2021-09-10
Payer: COMMERCIAL

## 2021-09-10 DIAGNOSIS — M79.646 THUMB PAIN, UNSPECIFIED LATERALITY: ICD-10-CM

## 2021-09-10 DIAGNOSIS — M65.4 DE QUERVAIN'S DISEASE (TENOSYNOVITIS): ICD-10-CM

## 2021-09-10 DIAGNOSIS — M25.511 CHRONIC RIGHT SHOULDER PAIN: ICD-10-CM

## 2021-09-10 DIAGNOSIS — G89.29 CHRONIC RIGHT SHOULDER PAIN: ICD-10-CM

## 2021-09-10 PROCEDURE — 97112 NEUROMUSCULAR REEDUCATION: CPT | Mod: GO | Performed by: OCCUPATIONAL THERAPIST

## 2021-09-10 PROCEDURE — 97140 MANUAL THERAPY 1/> REGIONS: CPT | Mod: GP | Performed by: PHYSICAL THERAPIST

## 2021-09-10 PROCEDURE — 97140 MANUAL THERAPY 1/> REGIONS: CPT | Mod: GO | Performed by: OCCUPATIONAL THERAPIST

## 2021-09-10 PROCEDURE — 97530 THERAPEUTIC ACTIVITIES: CPT | Mod: GP | Performed by: PHYSICAL THERAPIST

## 2021-09-10 PROCEDURE — 97110 THERAPEUTIC EXERCISES: CPT | Mod: GP | Performed by: PHYSICAL THERAPIST

## 2021-09-10 PROCEDURE — 97026 INFRARED THERAPY: CPT | Mod: GO | Performed by: OCCUPATIONAL THERAPIST

## 2021-09-11 NOTE — PROGRESS NOTES
Subjective:  HPI  Physical Exam                    Objective:  System    Physical Exam    General     ROS    Assessment/Plan:    PROGRESS  REPORT    Progress reporting period is from 7-1-21 to 9-10-21.       SUBJECTIVE  Subjective changes noted by patient:   Pt. has made good progress in PT the past few months with much less frequency and intensity of her R shoulder/upper back pain. She still is prone to some pain if she is not careful with her posture at her work station and with heavy lifting. However, she has been able to tolerate much more physical activity than before and much longer periods of computer use at her job. She continues to work on her HEP with good consistency.      Current pain level is  2/10.     Previous pain level was  5/10.   Changes in function:  Yes (See Goal flowsheet attached for changes in current functional level)  Adverse reaction to treatment or activity: None    OBJECTIVE  Changes noted in objective findings:  Strength R sh flex 4+/5; abd 4+/5; ER 4+/5; hor abd 4/5     ASSESSMENT/PLAN  Updated problem list and treatment plan: Diagnosis 1:  R upper back pain  Pain -  manual therapy, self management and education  Decreased strength - therapeutic exercise and therapeutic activities  Impaired muscle performance - neuro re-education  Decreased function - therapeutic activities  Impaired posture - neuro re-education  STG/LTGs have been met or progress has been made towards goals:  Yes (See Goal flow sheet completed today.)  Assessment of Progress: The patient's condition is improving.  Self Management Plans:  Patient has been instructed in a home treatment program.  Patient  has been instructed in self management of symptoms.  I have re-evaluated this patient and find that the nature, scope, duration and intensity of the therapy is appropriate for the medical condition of the patient.  Kimmy continues to require the following intervention to meet STG and LTG's:  PT    Recommendations:  This  patient would benefit from continued therapy.     Frequency:  2 X a month, once daily  Duration:  for 2 months        Please refer to the daily flowsheet for treatment today, total treatment time and time spent performing 1:1 timed codes.

## 2021-09-16 ENCOUNTER — OFFICE VISIT (OUTPATIENT)
Dept: FAMILY MEDICINE | Facility: CLINIC | Age: 31
End: 2021-09-16
Payer: COMMERCIAL

## 2021-09-16 ENCOUNTER — TELEPHONE (OUTPATIENT)
Dept: FAMILY MEDICINE | Facility: CLINIC | Age: 31
End: 2021-09-16

## 2021-09-16 ENCOUNTER — THERAPY VISIT (OUTPATIENT)
Dept: OCCUPATIONAL THERAPY | Facility: CLINIC | Age: 31
End: 2021-09-16
Payer: COMMERCIAL

## 2021-09-16 VITALS
RESPIRATION RATE: 20 BRPM | BODY MASS INDEX: 21.69 KG/M2 | SYSTOLIC BLOOD PRESSURE: 101 MMHG | HEIGHT: 66 IN | DIASTOLIC BLOOD PRESSURE: 73 MMHG | HEART RATE: 74 BPM | TEMPERATURE: 98 F | WEIGHT: 135 LBS | OXYGEN SATURATION: 97 %

## 2021-09-16 DIAGNOSIS — Z00.00 ANNUAL PHYSICAL EXAM: Primary | ICD-10-CM

## 2021-09-16 DIAGNOSIS — M79.646 THUMB PAIN, UNSPECIFIED LATERALITY: ICD-10-CM

## 2021-09-16 DIAGNOSIS — B96.89 BACTERIAL VAGINOSIS: Primary | ICD-10-CM

## 2021-09-16 DIAGNOSIS — N76.0 BACTERIAL VAGINOSIS: Primary | ICD-10-CM

## 2021-09-16 DIAGNOSIS — M65.4 DE QUERVAIN'S DISEASE (TENOSYNOVITIS): ICD-10-CM

## 2021-09-16 DIAGNOSIS — N89.8 VAGINAL DISCHARGE: ICD-10-CM

## 2021-09-16 DIAGNOSIS — Z23 NEED FOR PROPHYLACTIC VACCINATION AND INOCULATION AGAINST INFLUENZA: ICD-10-CM

## 2021-09-16 DIAGNOSIS — B35.3 TINEA PEDIS OF BOTH FEET: ICD-10-CM

## 2021-09-16 LAB
ANION GAP SERPL CALCULATED.3IONS-SCNC: 5 MMOL/L (ref 3–14)
BUN SERPL-MCNC: 11 MG/DL (ref 7–30)
CALCIUM SERPL-MCNC: 9.5 MG/DL (ref 8.5–10.1)
CHLORIDE BLD-SCNC: 107 MMOL/L (ref 94–109)
CHOLEST SERPL-MCNC: 183 MG/DL
CLUE CELLS: PRESENT
CO2 SERPL-SCNC: 27 MMOL/L (ref 20–32)
CREAT SERPL-MCNC: 0.77 MG/DL (ref 0.52–1.04)
ERYTHROCYTE [DISTWIDTH] IN BLOOD BY AUTOMATED COUNT: 12.4 % (ref 10–15)
FASTING STATUS PATIENT QL REPORTED: NO
GFR SERPL CREATININE-BSD FRML MDRD: >90 ML/MIN/1.73M2
GLUCOSE BLD-MCNC: 92 MG/DL (ref 70–99)
HCT VFR BLD AUTO: 41.9 % (ref 35–47)
HDLC SERPL-MCNC: 58 MG/DL
HGB BLD-MCNC: 14.3 G/DL (ref 11.7–15.7)
LDLC SERPL CALC-MCNC: 100 MG/DL
MCH RBC QN AUTO: 29.3 PG (ref 26.5–33)
MCHC RBC AUTO-ENTMCNC: 34.1 G/DL (ref 31.5–36.5)
MCV RBC AUTO: 86 FL (ref 78–100)
NONHDLC SERPL-MCNC: 125 MG/DL
PLATELET # BLD AUTO: 230 10E3/UL (ref 150–450)
POTASSIUM BLD-SCNC: 4.2 MMOL/L (ref 3.4–5.3)
RBC # BLD AUTO: 4.88 10E6/UL (ref 3.8–5.2)
SODIUM SERPL-SCNC: 139 MMOL/L (ref 133–144)
T PALLIDUM AB SER QL: NONREACTIVE
TRICHOMONAS, WET PREP: ABNORMAL
TRIGL SERPL-MCNC: 126 MG/DL
WBC # BLD AUTO: 6.7 10E3/UL (ref 4–11)
WBC'S/HIGH POWER FIELD, WET PREP: ABNORMAL
YEAST, WET PREP: ABNORMAL

## 2021-09-16 PROCEDURE — 36415 COLL VENOUS BLD VENIPUNCTURE: CPT | Performed by: INTERNAL MEDICINE

## 2021-09-16 PROCEDURE — 99213 OFFICE O/P EST LOW 20 MIN: CPT | Mod: 25 | Performed by: INTERNAL MEDICINE

## 2021-09-16 PROCEDURE — 90686 IIV4 VACC NO PRSV 0.5 ML IM: CPT | Performed by: INTERNAL MEDICINE

## 2021-09-16 PROCEDURE — 80061 LIPID PANEL: CPT | Performed by: INTERNAL MEDICINE

## 2021-09-16 PROCEDURE — 80048 BASIC METABOLIC PNL TOTAL CA: CPT | Performed by: INTERNAL MEDICINE

## 2021-09-16 PROCEDURE — 90471 IMMUNIZATION ADMIN: CPT | Performed by: INTERNAL MEDICINE

## 2021-09-16 PROCEDURE — 87591 N.GONORRHOEAE DNA AMP PROB: CPT | Performed by: INTERNAL MEDICINE

## 2021-09-16 PROCEDURE — 99395 PREV VISIT EST AGE 18-39: CPT | Mod: 25 | Performed by: INTERNAL MEDICINE

## 2021-09-16 PROCEDURE — 97140 MANUAL THERAPY 1/> REGIONS: CPT | Mod: GO | Performed by: OCCUPATIONAL THERAPIST

## 2021-09-16 PROCEDURE — 97026 INFRARED THERAPY: CPT | Mod: GO | Performed by: OCCUPATIONAL THERAPIST

## 2021-09-16 PROCEDURE — 87210 SMEAR WET MOUNT SALINE/INK: CPT | Performed by: INTERNAL MEDICINE

## 2021-09-16 PROCEDURE — 87491 CHLMYD TRACH DNA AMP PROBE: CPT | Performed by: INTERNAL MEDICINE

## 2021-09-16 PROCEDURE — 85027 COMPLETE CBC AUTOMATED: CPT | Performed by: INTERNAL MEDICINE

## 2021-09-16 PROCEDURE — 97112 NEUROMUSCULAR REEDUCATION: CPT | Mod: GO | Performed by: OCCUPATIONAL THERAPIST

## 2021-09-16 PROCEDURE — 97110 THERAPEUTIC EXERCISES: CPT | Mod: GO | Performed by: OCCUPATIONAL THERAPIST

## 2021-09-16 PROCEDURE — 86780 TREPONEMA PALLIDUM: CPT | Performed by: INTERNAL MEDICINE

## 2021-09-16 RX ORDER — CICLOPIROX OLAMINE 7.7 MG/G
CREAM TOPICAL 2 TIMES DAILY
Qty: 90 G | Refills: 3 | Status: SHIPPED | OUTPATIENT
Start: 2021-09-16 | End: 2021-10-14

## 2021-09-16 RX ORDER — LEVONORGESTREL AND ETHINYL ESTRADIOL 0.15-0.03
1 KIT ORAL DAILY
COMMUNITY
Start: 2021-07-13

## 2021-09-16 RX ORDER — METRONIDAZOLE 500 MG/1
500 TABLET ORAL 2 TIMES DAILY
Qty: 14 TABLET | Refills: 0 | Status: SHIPPED | OUTPATIENT
Start: 2021-09-16 | End: 2021-09-23

## 2021-09-16 ASSESSMENT — ENCOUNTER SYMPTOMS
HEADACHES: 0
HEMATOCHEZIA: 0
PARESTHESIAS: 0
FEVER: 0
JOINT SWELLING: 0
NAUSEA: 0
NERVOUS/ANXIOUS: 0
PALPITATIONS: 0
HEARTBURN: 0
DIARRHEA: 0
SORE THROAT: 0
EYE PAIN: 0
BREAST MASS: 0
ABDOMINAL PAIN: 0
COUGH: 0
CONSTIPATION: 0
ARTHRALGIAS: 0
CHILLS: 0
DYSURIA: 0
HEMATURIA: 0
FREQUENCY: 0
DIZZINESS: 0
SHORTNESS OF BREATH: 0
MYALGIAS: 0
WEAKNESS: 0

## 2021-09-16 ASSESSMENT — MIFFLIN-ST. JEOR: SCORE: 1359.11

## 2021-09-16 NOTE — PROGRESS NOTES
SOAP Note Objective Information for 9/16/2021:    Pain Level (Scale 0-10):   10/28/2020 11/11/20 12/2/20 4/15/21 4/23/21 4/30/21 5/7/21   At Rest 3/10 3/10 1/10 0/10 4/10 3/10 2/10   With Use 8/10 4-5/10 2/10 2-3/10 5-6/10 4/10 4-5/10     Pain Level (Scale 0-10):   5/14/21 5/21/21 5/28/21 6/4/21 6/17/21 8/19/21 9/16   At Rest 5 1 0 2 R Dorsal axilla 4  R Arm 2  L Wrist 0 R: 0  L:0 R: 0-1  L: 0   With Use 7 2 1 3 R dorsal axilla 6  R Arm 4  L Wrist 3 R: 2  L: 1 R: 3  L: 3     Pain Description:  Date 10/28/2020 4/15/21 6/17/21 8/19/21   Location Medial and lateral elbow R thumb, radiates up to lateral elbow R dorsal axilla, radial forearm and thumb, volar wrist to medial elbow  L radial wrist R:volar wrist, adductor, medial elbow  L: dorsal thumb, radial wrist   Pain Quality Aching, Dull and Sharp Fatigue, sharp, aching Right - feels like nerve pain  Left - sore Right: sharp  Left: Fatigue   Frequency intermittent   intermittent Constant, intermittent intermittent   Pain is worst  daytime daytime Daytime, as the day goes on. Feels better in morning upon waking daytime   Exacerbated by  holding, gripping, elbows flexed, use of right thumb Mousing, chopping with knife, washing dishes, pinching/gripping/holding Washing dishes, picking up anything heavier than 5lbs, sitting/slouching Watering garden/holding hose, washing dishes, folding laundry   Relieved by rest and wrist braces, extending elbows Self massage with massage gun, splints at night, rest Walking, massage gun, splints Clip in adductor, massage   Progression Gradually improving. Improving in elbows, ongoing in thumb Worsening in dorsal axilla and right arm, improving in left improving     Home Program:   Epsom Salt Soaks  STM to adductor with clip  STM to FA flexors, extensors and palm with spiky ball  FA extensor stretch  Thumb RABD stretch - 4/23/21 discontinue due to pain  Dequervain's Stretch  K-tape trial (Dequervain's method)  Proximal RN glides  Foam  rolling to lats and pecs  Foam roller stretch - pec minor  Workstation recommendations: vertical mouse, kinesis keyboard, new adjustable desk  STM to middle trap with ball while arm is adducted  Adductor stretch on table  Strengthening 3 x week:  Wrist isotonics with 2lbs (flexion/extension)   strengthening with ball  9/16/21:  Key pinch with foam - focus on MP stabilization/not allowing joint to collapse    Next Visit:  Progress to 3 point pinch with foam wedge - focus on MP stabilization/not allowing joint to collapse  Laser and STM to trap as needed  STM - flexors, extensors, adductor, thenars  Stretches    Please refer to the daily flowsheet for treatment provided today.

## 2021-09-16 NOTE — TELEPHONE ENCOUNTER
Pt called to verify if PCP send in scripts from visit today   Notified pt that Flagyl and Loprox scripts were sent in to [facility]    Giana RAMIREZ RN

## 2021-09-16 NOTE — PATIENT INSTRUCTIONS
You were seen in [facility] today for an annual physical (preventive) visit. I ordered blood work which includes blood count, electrolytes, kidney function, thyroid function, lipid profile and some screening tests. 8-12 hours of fasting is required for checking lipids. You will get a mychart message or a call about test results.

## 2021-09-16 NOTE — PROGRESS NOTES
SUBJECTIVE:   CC: Kimmy Bethea is an 30 year old woman who presents for preventive health visit.     Kimmy is a 30-year-old young lady who presented to the clinic for an annual physical.  She has some complaints as well which includes skin peeling between her toes bilaterally.  She has been to dermatology and was given a 2-week course of clotrimazole cream which did not help her symptoms.  She also complains of vaginal itching and on yellow discharge.  She is sexually active.  She has 1 sexual partner and uses condoms consistently.  She is due for a flu shot and lab work.      Patient has been advised of split billing requirements and indicates understanding: Yes  Healthy Habits:     Getting at least 3 servings of Calcium per day:  NO    Bi-annual eye exam:  Yes    Dental care twice a year:  Yes    Sleep apnea or symptoms of sleep apnea:  None    Diet:  Regular (no restrictions)    Frequency of exercise:  1 day/week    Duration of exercise:  15-30 minutes    Taking medications regularly:  No    Medication side effects:  Not applicable    PHQ-2 Total Score: 0    Additional concerns today:  No        Today's PHQ-2 Score:   PHQ-2 ( 1999 Pfizer) 9/16/2021   Q1: Little interest or pleasure in doing things 0   Q2: Feeling down, depressed or hopeless 0   PHQ-2 Score 0   Q1: Little interest or pleasure in doing things Not at all   Q2: Feeling down, depressed or hopeless Not at all   PHQ-2 Score 0       Abuse: Current or Past (Physical, Sexual or Emotional) - No  Do you feel safe in your environment? Yes    Have you ever done Advance Care Planning? (For example, a Health Directive, POLST, or a discussion with a medical provider or your loved ones about your wishes): No, advance care planning information given to patient to review.  Patient declined advance care planning discussion at this time.    Social History     Tobacco Use     Smoking status: Never Smoker     Smokeless tobacco: Never Used   Substance Use Topics      Alcohol use: Yes     Comment: 2 drinks per week     If you drink alcohol do you typically have >3 drinks per day or >7 drinks per week? No    Alcohol Use 9/16/2021   Prescreen: >3 drinks/day or >7 drinks/week? No   Prescreen: >3 drinks/day or >7 drinks/week? -       Reviewed orders with patient.  Reviewed health maintenance and updated orders accordingly - Yes  Lab work is in process    Breast Cancer Screening:  Any new diagnosis of family breast, ovarian, or bowel cancer? No    FHS-7: No flowsheet data found.  click delete button to remove this line now  Patient under 40 years of age: Routine Mammogram Screening not recommended.   Pertinent mammograms are reviewed under the imaging tab.    History of abnormal Pap smear: NO - age 21-29 PAP every 3 years recommended  PAP / HPV 4/4/2019   PAP (Historical) NIL     Reviewed and updated as needed this visit by clinical staff  Tobacco  Allergies  Meds  Problems  Med Hx  Surg Hx  Fam Hx          Reviewed and updated as needed this visit by Provider    Review of Systems   Constitutional: Negative for chills and fever.   HENT: Negative for congestion, ear pain, hearing loss and sore throat.    Eyes: Negative for pain and visual disturbance.   Respiratory: Negative for cough and shortness of breath.    Cardiovascular: Negative for chest pain, palpitations and peripheral edema.   Gastrointestinal: Negative for abdominal pain, constipation, diarrhea, heartburn, hematochezia and nausea.   Breasts:  Negative for tenderness, breast mass and discharge.   Genitourinary: Negative for dysuria, frequency, genital sores, hematuria, pelvic pain, urgency, vaginal bleeding and vaginal discharge.   Musculoskeletal: Negative for arthralgias, joint swelling and myalgias.   Skin: Negative for rash.   Neurological: Negative for dizziness, weakness, headaches and paresthesias.   Psychiatric/Behavioral: Negative for mood changes. The patient is not nervous/anxious.         OBJECTIVE:   BP  "101/73 (BP Location: Left arm, Cuff Size: Adult Regular)   Pulse 74   Temp 98  F (36.7  C) (Temporal)   Resp 20   Ht 1.676 m (5' 6\")   Wt 61.2 kg (135 lb)   LMP 09/08/2021   SpO2 97%   BMI 21.79 kg/m    Physical Exam  Vitals reviewed.   Constitutional:       Appearance: Normal appearance.   HENT:      Right Ear: Tympanic membrane normal. There is no impacted cerumen.      Left Ear: Tympanic membrane normal. There is no impacted cerumen.      Mouth/Throat:      Mouth: Mucous membranes are moist.      Pharynx: Oropharynx is clear. No oropharyngeal exudate or posterior oropharyngeal erythema.   Cardiovascular:      Rate and Rhythm: Normal rate and regular rhythm.      Heart sounds: Normal heart sounds. No murmur heard.     Pulmonary:      Effort: Pulmonary effort is normal. No respiratory distress.      Breath sounds: Normal breath sounds. No wheezing.   Abdominal:      General: Abdomen is flat. There is no distension.      Palpations: Abdomen is soft.      Tenderness: There is no abdominal tenderness. There is no guarding.   Genitourinary:     Vagina: No signs of injury and foreign body. Vaginal discharge present. No erythema, tenderness, bleeding or lesions.   Musculoskeletal:         General: Normal range of motion.      Cervical back: Normal range of motion. No rigidity.      Right lower leg: No edema.      Left lower leg: No edema.   Lymphadenopathy:      Cervical: No cervical adenopathy.   Neurological:      General: No focal deficit present.      Mental Status: She is alert and oriented to person, place, and time.   Psychiatric:         Mood and Affect: Mood normal.         Behavior: Behavior normal.         Thought Content: Thought content normal.         Judgment: Judgment normal.           ASSESSMENT/PLAN:       ICD-10-CM    1. Annual physical exam  Z00.00 REVIEW OF HEALTH MAINTENANCE PROTOCOL ORDERS     Basic metabolic panel     CBC with Platelets (Today)     Lipid Profile     Treponema Abs w Reflex " to RPR and Titer [HFJ5416]     Chlamydia trachomatis PCR [HJQ660]     Neisseria gonorrhoeae PCR [BMX1776]     Basic metabolic panel     CBC with Platelets (Today)     Lipid Profile     Treponema Abs w Reflex to RPR and Titer [[id number]]     Chlamydia trachomatis PCR [VEY102]     Neisseria gonorrhoeae PCR [[id number]]     CANCELED: HC FLU VAC PRESRV FREE QUAD SPLIT VIR > 6 MONTHS IM ([id number])   2. Vaginal discharge  N89.8 Wet prep - Clinic Collect   3. Tinea pedis of both feet  B35.3 ciclopirox (LOPROX) 0.77 % cream   4. Need for prophylactic vaccination and inoculation against influenza  Z23 INFLUENZA VACCINE IM > 6 MONTHS VALENT IIV4 (AFLURIA/FLUZONE)     Kimmy was seen today for physical, flu shot and imm/inj.    Diagnoses and all orders for this visit:    Annual physical exam  Lab work ordered. STI screen ordered. Up-to-date with screening tests.  Discussed about safe sex practices.   -     Basic metabolic panel; Future  -     CBC with Platelets (Today); Future  -     Lipid Profile; Future  -     Treponema Abs w Reflex to RPR and Titer [[id number]]; Future  -     Chlamydia trachomatis PCR [[id number]]; Future  -     Neisseria gonorrhoeae PCR [[id number]]; Future    Vaginal discharge  Vaginal itching and discharge. One exam discharge is yellow in color, no lesions in  Vagina. Discussed I will let her know of results.   -     Wet prep - Clinic Collect    Tinea pedis of both feet  Failed 2 weeks of clotrimazole topical treatment. On exam there is minimal infection which is intermittent. Ciclopirox ointment for 4 weeks. Discussed with patient if symptoms don't subside we will do oral therapy.     -     ciclopirox (LOPROX) 0.77 % cream; Apply topically 2 times daily for 28 days        Patient has been advised of split billing requirements and indicates understanding: Yes  COUNSELING:  Reviewed preventive health counseling, as reflected in patient instructions       Safe sex practices/STD prevention    Estimated body mass index is  "21.79 kg/m  as calculated from the following:    Height as of this encounter: 1.676 m (5' 6\").    Weight as of this encounter: 61.2 kg (135 lb).    She reports that she has never smoked. She has never used smokeless tobacco.    Counseling Resources:  ATP IV Guidelines  Pooled Cohorts Equation Calculator  Breast Cancer Risk Calculator  BRCA-Related Cancer Risk Assessment: FHS-7 Tool  FRAX Risk Assessment  ICSI Preventive Guidelines  Dietary Guidelines for Americans, 2010  USDA's MyPlate  ASA Prophylaxis  Lung CA Screening    LINDA MASSEY MD  [facility]  "

## 2021-09-17 LAB
C TRACH DNA SPEC QL NAA+PROBE: NEGATIVE
N GONORRHOEA DNA SPEC QL NAA+PROBE: NEGATIVE

## 2021-09-23 NOTE — PROGRESS NOTES
SOAP Note Objective Information for 9/24/2021:    Pain Level (Scale 0-10):   10/28/2020 11/11/20 12/2/20 4/15/21 4/23/21 4/30/21 5/7/21   At Rest 3/10 3/10 1/10 0/10 4/10 3/10 2/10   With Use 8/10 4-5/10 2/10 2-3/10 5-6/10 4/10 4-5/10     Pain Level (Scale 0-10):   5/14/21 5/21/21 5/28/21 6/4/21 6/17/21 8/19/21 9/16   At Rest 5 1 0 2 R Dorsal axilla 4  R Arm 2  L Wrist 0 R: 0  L:0 R: 0-1  L: 0   With Use 7 2 1 3 R dorsal axilla 6  R Arm 4  L Wrist 3 R: 2  L: 1 R: 3  L: 3      9/24/2021         At Rest R: 0-1  L: 0         With Use R: 3  L: 3             Pain Description:  Date 10/28/2020 4/15/21 6/17/21 8/19/21   Location Medial and lateral elbow R thumb, radiates up to lateral elbow R dorsal axilla, radial forearm and thumb, volar wrist to medial elbow  L radial wrist R:volar wrist, adductor, medial elbow  L: dorsal thumb, radial wrist   Pain Quality Aching, Dull and Sharp Fatigue, sharp, aching Right - feels like nerve pain  Left - sore Right: sharp  Left: Fatigue   Frequency intermittent   intermittent Constant, intermittent intermittent   Pain is worst  daytime daytime Daytime, as the day goes on. Feels better in morning upon waking daytime   Exacerbated by  holding, gripping, elbows flexed, use of right thumb Mousing, chopping with knife, washing dishes, pinching/gripping/holding Washing dishes, picking up anything heavier than 5lbs, sitting/slouching Watering garden/holding hose, washing dishes, folding laundry   Relieved by rest and wrist braces, extending elbows Self massage with massage gun, splints at night, rest Walking, massage gun, splints Clip in adductor, massage   Progression Gradually improving. Improving in elbows, ongoing in thumb Worsening in dorsal axilla and right arm, improving in left improving       Home Program:   [employer]  STM to adductor with clip  STM to FA flexors, extensors and palm with spiky ball  FA extensor stretch  Thumb RABD stretch - 4/23/21 discontinue due to  pain  Dequervain's Stretch  K-tape trial (Dequervain's method)  Proximal RN glides  Foam rolling to lats and pecs  Foam roller stretch - pec minor  Workstation recommendations: vertical mouse, kinesis keyboard, new adjustable desk  STM to middle trap with ball while arm is adducted  Adductor stretch on table  Strengthening 3 x week:  Wrist isotonics with 2lbs (flexion/extension)   strengthening with ball  9/16/21:  Key pinch with foam - focus on MP stabilization/not allowing joint to collapse    9/24/2021  Hold on strengthening due to causing increase pain  Massage FCR tendon and muscle  K-tape FCR and extensors/thumb   to use dycem to help with fascia pull of thumb and wrist ext/abd    Next Visit:  Progress to 3 point pinch with foam wedge - focus on MP stabilization/not allowing joint to collapse  Laser and STM to trap as needed  STM - flexors, extensors, adductor, thenars  Stretches    Please refer to the daily flowsheet for treatment provided today.

## 2021-09-24 ENCOUNTER — THERAPY VISIT (OUTPATIENT)
Dept: OCCUPATIONAL THERAPY | Facility: CLINIC | Age: 31
End: 2021-09-24
Payer: COMMERCIAL

## 2021-09-24 ENCOUNTER — THERAPY VISIT (OUTPATIENT)
Dept: PHYSICAL THERAPY | Facility: CLINIC | Age: 31
End: 2021-09-24
Payer: COMMERCIAL

## 2021-09-24 ENCOUNTER — LAB (OUTPATIENT)
Dept: LAB | Facility: CLINIC | Age: 31
End: 2021-09-24
Payer: COMMERCIAL

## 2021-09-24 DIAGNOSIS — Z00.00 ROUTINE GENERAL MEDICAL EXAMINATION AT A HEALTH CARE FACILITY: ICD-10-CM

## 2021-09-24 DIAGNOSIS — M65.4 DE QUERVAIN'S DISEASE (TENOSYNOVITIS): ICD-10-CM

## 2021-09-24 DIAGNOSIS — G89.29 CHRONIC RIGHT SHOULDER PAIN: ICD-10-CM

## 2021-09-24 DIAGNOSIS — M79.646 THUMB PAIN, UNSPECIFIED LATERALITY: ICD-10-CM

## 2021-09-24 DIAGNOSIS — M25.511 CHRONIC RIGHT SHOULDER PAIN: ICD-10-CM

## 2021-09-24 PROCEDURE — 97110 THERAPEUTIC EXERCISES: CPT | Mod: GO | Performed by: OCCUPATIONAL THERAPIST

## 2021-09-24 PROCEDURE — 97140 MANUAL THERAPY 1/> REGIONS: CPT | Mod: GP | Performed by: PHYSICAL THERAPIST

## 2021-09-24 PROCEDURE — 97530 THERAPEUTIC ACTIVITIES: CPT | Mod: GP | Performed by: PHYSICAL THERAPIST

## 2021-09-24 PROCEDURE — 82947 ASSAY GLUCOSE BLOOD QUANT: CPT

## 2021-09-24 PROCEDURE — 36415 COLL VENOUS BLD VENIPUNCTURE: CPT

## 2021-09-24 PROCEDURE — 97140 MANUAL THERAPY 1/> REGIONS: CPT | Mod: GO | Performed by: OCCUPATIONAL THERAPIST

## 2021-09-24 PROCEDURE — 97110 THERAPEUTIC EXERCISES: CPT | Mod: GP | Performed by: PHYSICAL THERAPIST

## 2021-09-24 PROCEDURE — 80061 LIPID PANEL: CPT

## 2021-09-24 PROCEDURE — 97112 NEUROMUSCULAR REEDUCATION: CPT | Mod: GO | Performed by: OCCUPATIONAL THERAPIST

## 2021-09-24 PROCEDURE — 97026 INFRARED THERAPY: CPT | Mod: GO | Performed by: OCCUPATIONAL THERAPIST

## 2021-09-25 LAB
CHOLEST SERPL-MCNC: 154 MG/DL
FASTING STATUS PATIENT QL REPORTED: YES
FASTING STATUS PATIENT QL REPORTED: YES
GLUCOSE BLD-MCNC: 86 MG/DL (ref 70–99)
HDLC SERPL-MCNC: 63 MG/DL
LDLC SERPL CALC-MCNC: 76 MG/DL
NONHDLC SERPL-MCNC: 91 MG/DL
TRIGL SERPL-MCNC: 75 MG/DL

## 2021-09-25 NOTE — PROGRESS NOTES
Subjective:  HPI  Physical Exam                    Objective:  System    Physical Exam    General     ROS    Assessment/Plan:    SUBJECTIVE  Subjective changes as noted by pt:   Pt. reports having a good 2 weeks again with minimal R scapular area pain with home and work activities.   Current pain level: 2/10   Changes in function:  Yes (See Goal flowsheet attached for changes in current functional level)     Adverse reaction to treatment or activity:  None    OBJECTIVE  Changes in objective findings:  Strength R sh flex 4+/5; abd 4+/5; ER 4+/5; hor abd 4/5     ASSESSMENT  Kimmy continues to require intervention to meet STG and LTG's: PT  Patient's symptoms are resolving.  Response to therapy has shown an improvement in  pain level, ROM , strength and function  Progress made towards STG/LTG?  Yes (See Goal flowsheet attached for updates on achievement of STG and LTG)    PLAN  Current treatment program is being advanced to more complex exercises.    PTA/ATC plan:  N/A    Please refer to the daily flowsheet for treatment today, total treatment time and time spent performing 1:1 timed codes.

## 2021-10-07 ENCOUNTER — THERAPY VISIT (OUTPATIENT)
Dept: OCCUPATIONAL THERAPY | Facility: CLINIC | Age: 31
End: 2021-10-07
Payer: COMMERCIAL

## 2021-10-07 ENCOUNTER — THERAPY VISIT (OUTPATIENT)
Dept: PHYSICAL THERAPY | Facility: CLINIC | Age: 31
End: 2021-10-07
Payer: COMMERCIAL

## 2021-10-07 DIAGNOSIS — M65.4 DE QUERVAIN'S DISEASE (TENOSYNOVITIS): ICD-10-CM

## 2021-10-07 DIAGNOSIS — M79.646 THUMB PAIN, UNSPECIFIED LATERALITY: ICD-10-CM

## 2021-10-07 DIAGNOSIS — M25.511 CHRONIC RIGHT SHOULDER PAIN: ICD-10-CM

## 2021-10-07 DIAGNOSIS — G89.29 CHRONIC RIGHT SHOULDER PAIN: ICD-10-CM

## 2021-10-07 PROCEDURE — 97530 THERAPEUTIC ACTIVITIES: CPT | Mod: GP | Performed by: PHYSICAL THERAPIST

## 2021-10-07 PROCEDURE — 97026 INFRARED THERAPY: CPT | Mod: GO | Performed by: OCCUPATIONAL THERAPIST

## 2021-10-07 PROCEDURE — 97140 MANUAL THERAPY 1/> REGIONS: CPT | Mod: GP | Performed by: PHYSICAL THERAPIST

## 2021-10-07 PROCEDURE — 97140 MANUAL THERAPY 1/> REGIONS: CPT | Mod: GO | Performed by: OCCUPATIONAL THERAPIST

## 2021-10-07 PROCEDURE — 97110 THERAPEUTIC EXERCISES: CPT | Mod: GP | Performed by: PHYSICAL THERAPIST

## 2021-10-07 PROCEDURE — 97110 THERAPEUTIC EXERCISES: CPT | Mod: GO | Performed by: OCCUPATIONAL THERAPIST

## 2021-10-07 PROCEDURE — 97112 NEUROMUSCULAR REEDUCATION: CPT | Mod: GO | Performed by: OCCUPATIONAL THERAPIST

## 2021-10-07 NOTE — PROGRESS NOTES
Subjective:  HPI  Physical Exam                    Objective:  System    Physical Exam    General     ROS    Assessment/Plan:    SUBJECTIVE  Subjective changes as noted by pt:   Pt. reports another good 2 week stretch with decreased R shoulder pain. She notes very little pain or problems with working/computer work the past 2 weeks.    Current pain level: 2/10   Changes in function:  Yes (See Goal flowsheet attached for changes in current functional level)     Adverse reaction to treatment or activity:  None    OBJECTIVE  Changes in objective findings:  Strength R sh flex 4+/5; abd 4+/5; ER 4+/5; hor abd 4/5     ASSESSMENT  Kimmy continues to require intervention to meet STG and LTG's: PT  Patient's symptoms are resolving.  Response to therapy has shown an improvement in  pain level and strength  Progress made towards STG/LTG?  Yes (See Goal flowsheet attached for updates on achievement of STG and LTG)    PLAN  Current treatment program is being advanced to more complex exercises.    PTA/ATC plan:  N/A    Please refer to the daily flowsheet for treatment today, total treatment time and time spent performing 1:1 timed codes.

## 2022-03-14 ENCOUNTER — ANCILLARY PROCEDURE (OUTPATIENT)
Dept: GENERAL RADIOLOGY | Facility: CLINIC | Age: 32
End: 2022-03-14
Attending: INTERNAL MEDICINE
Payer: COMMERCIAL

## 2022-03-14 ENCOUNTER — OFFICE VISIT (OUTPATIENT)
Dept: FAMILY MEDICINE | Facility: CLINIC | Age: 32
End: 2022-03-14
Payer: COMMERCIAL

## 2022-03-14 ENCOUNTER — TELEPHONE (OUTPATIENT)
Dept: FAMILY MEDICINE | Facility: CLINIC | Age: 32
End: 2022-03-14

## 2022-03-14 VITALS
HEIGHT: 66 IN | TEMPERATURE: 99.3 F | OXYGEN SATURATION: 97 % | HEART RATE: 87 BPM | DIASTOLIC BLOOD PRESSURE: 79 MMHG | BODY MASS INDEX: 22.42 KG/M2 | WEIGHT: 139.5 LBS | SYSTOLIC BLOOD PRESSURE: 125 MMHG | RESPIRATION RATE: 12 BRPM

## 2022-03-14 DIAGNOSIS — R05.8 COUGH PRODUCTIVE OF YELLOW SPUTUM: ICD-10-CM

## 2022-03-14 DIAGNOSIS — J06.9 VIRAL UPPER RESPIRATORY TRACT INFECTION: Primary | ICD-10-CM

## 2022-03-14 LAB
FLUAV AG SPEC QL IA: NEGATIVE
FLUBV AG SPEC QL IA: NEGATIVE
SARS-COV-2 RNA RESP QL NAA+PROBE: NEGATIVE

## 2022-03-14 PROCEDURE — 87804 INFLUENZA ASSAY W/OPTIC: CPT | Performed by: INTERNAL MEDICINE

## 2022-03-14 PROCEDURE — U0003 INFECTIOUS AGENT DETECTION BY NUCLEIC ACID (DNA OR RNA); SEVERE ACUTE RESPIRATORY SYNDROME CORONAVIRUS 2 (SARS-COV-2) (CORONAVIRUS DISEASE [COVID-19]), AMPLIFIED PROBE TECHNIQUE, MAKING USE OF HIGH THROUGHPUT TECHNOLOGIES AS DESCRIBED BY CMS-2020-01-R: HCPCS | Performed by: INTERNAL MEDICINE

## 2022-03-14 PROCEDURE — 71046 X-RAY EXAM CHEST 2 VIEWS: CPT | Performed by: RADIOLOGY

## 2022-03-14 PROCEDURE — 99213 OFFICE O/P EST LOW 20 MIN: CPT | Performed by: INTERNAL MEDICINE

## 2022-03-14 PROCEDURE — U0005 INFEC AGEN DETEC AMPLI PROBE: HCPCS | Performed by: INTERNAL MEDICINE

## 2022-03-14 RX ORDER — BENZONATATE 100 MG/1
100 CAPSULE ORAL 3 TIMES DAILY PRN
Qty: 30 CAPSULE | Refills: 0 | Status: SHIPPED | OUTPATIENT
Start: 2022-03-14 | End: 2022-03-24

## 2022-03-14 RX ORDER — AZITHROMYCIN 250 MG/1
TABLET, FILM COATED ORAL
Qty: 6 TABLET | Refills: 0 | Status: SHIPPED | OUTPATIENT
Start: 2022-03-14 | End: 2022-03-19

## 2022-03-14 ASSESSMENT — PAIN SCALES - GENERAL: PAINLEVEL: MODERATE PAIN (4)

## 2022-03-14 NOTE — PROGRESS NOTES
Assessment & Plan     Viral upper respiratory tract infection  Most likely acute viral infection.  Will rule out influenza and COVID-19.  Given the course of this illness, this may have turned into a bacterial superinfection.  - Influenza A & B Antigen - Clinic Collect  - Symptomatic; Yes; 3/7/2022 COVID-19 Virus (Coronavirus) by PCR Nose    Cough productive of yellow sputum  Cough medication given.  Chest x-ray ordered to rule out pneumonia.   - XR Chest 2 Views; Future  - benzonatate (TESSALON) 100 MG capsule; Take 1 capsule (100 mg) by mouth 3 times daily as needed for cough    No follow-ups on file.    LINDA MASSEY MD  [facility] [place]    Subjective   Kimmy is a 31 year old who presents for the following health issues     URI    History of Present Illness       Reason for visit:  Worsening flu symptoms  Symptom onset:  3-7 days ago  Symptoms include:  Debilitating cough, congestion, sore throat, runny nose  Symptom intensity:  Severe  Symptom progression:  Worsening  Had these symptoms before:  Yes  Has tried/received treatment for these symptoms:  Yes  Previous treatment was successful:  Yes  Prior treatment description:  It was two courses of antibiotics  What makes it worse:  Dryness  What makes it better:  Warm water or humidity    She eats 0-1 servings of fruits and vegetables daily.She consumes 0 sweetened beverage(s) daily.She exercises with enough effort to increase her heart rate 20 to 29 minutes per day.  She exercises with enough effort to increase her heart rate 3 or less days per week.   She is taking medications regularly.     Kimmy is a 31-year-old female who presented to the clinic with complaints of upper respiratory symptoms for 1 week.  She started having rhinitis, cough, sore throat 1 week ago.  Sore throat has improved.  She does not have a high fever, reports body aches and fatigue.  Cough is severe, throughout the day, associated with yellow-colored sputum.  Patient took two  "COVID at home rapid tests, both were negative; took them Monday and Friday.  She denies chest pain, shortness of breath.    Review of Systems         Objective    /79 (BP Location: Right arm, Cuff Size: Adult Regular)   Pulse 87   Temp 99.3  F (37.4  C) (Tympanic)   Resp 12   Ht 1.676 m (5' 6\")   Wt 63.3 kg (139 lb 8 oz)   LMP 02/28/2022 (Approximate)   SpO2 97%   BMI 22.52 kg/m    Body mass index is 22.52 kg/m .  Physical Exam  Vitals reviewed.   Constitutional:       Appearance: She is ill-appearing.   HENT:      Nose: No congestion.      Mouth/Throat:      Mouth: Mucous membranes are moist.      Pharynx: Oropharynx is clear. Posterior oropharyngeal erythema present. No oropharyngeal exudate.   Pulmonary:      Effort: Pulmonary effort is normal. No respiratory distress.      Breath sounds: Normal breath sounds. No wheezing or rales.                "

## 2022-09-11 ENCOUNTER — HEALTH MAINTENANCE LETTER (OUTPATIENT)
Age: 32
End: 2022-09-11

## 2023-01-23 ENCOUNTER — HEALTH MAINTENANCE LETTER (OUTPATIENT)
Age: 33
End: 2023-01-23

## 2024-02-24 ENCOUNTER — HEALTH MAINTENANCE LETTER (OUTPATIENT)
Age: 34
End: 2024-02-24

## 2025-03-09 ENCOUNTER — HEALTH MAINTENANCE LETTER (OUTPATIENT)
Age: 35
End: 2025-03-09